# Patient Record
Sex: FEMALE | Race: WHITE
[De-identification: names, ages, dates, MRNs, and addresses within clinical notes are randomized per-mention and may not be internally consistent; named-entity substitution may affect disease eponyms.]

---

## 2018-10-12 ENCOUNTER — HOSPITAL ENCOUNTER (EMERGENCY)
Dept: HOSPITAL 25 - UCCORT | Age: 43
Discharge: HOME | End: 2018-10-12
Payer: COMMERCIAL

## 2018-10-12 VITALS — DIASTOLIC BLOOD PRESSURE: 78 MMHG | SYSTOLIC BLOOD PRESSURE: 112 MMHG

## 2018-10-12 DIAGNOSIS — F17.210: ICD-10-CM

## 2018-10-12 DIAGNOSIS — M79.7: ICD-10-CM

## 2018-10-12 DIAGNOSIS — M62.838: Primary | ICD-10-CM

## 2018-10-12 PROCEDURE — 99213 OFFICE O/P EST LOW 20 MIN: CPT

## 2018-10-12 PROCEDURE — G0463 HOSPITAL OUTPT CLINIC VISIT: HCPCS

## 2018-10-12 PROCEDURE — 96372 THER/PROPH/DIAG INJ SC/IM: CPT

## 2018-10-12 NOTE — UC
Shoulder Pain HPI





- HPI Summary


HPI Summary: 





Pt with h/o chronic pain, fibromylagia presents to  with "knot" and pain in 

left posterior shoulder. Pt states has been present for 3 days. no relief with 

Rx oxycodong or fentanyl. Pt has tried heat and gentle massage without relief. 

Pt reports paresthesia LUE. pt states called Dr. Calderon and recommended she come 

here. Pt is RHD. no trauma. no cp, sob. no other injuries





Pt's medications reviewed this visit





- History of Current Complaint


Chief Complaint: UCUpperExtremity


Stated Complaint: LEFT SHOULDER PAIN


Time Seen by Provider: 10/12/18 13:09


Hx Obtained From: Patient


Hx Last Menstrual Period: 09/20/18


Pregnant?: No


Onset/Duration: Gradual Onset


Pain Intensity: 10





- Allergies/Home Medications


Allergies/Adverse Reactions: 


 Allergies











Allergy/AdvReac Type Severity Reaction Status Date / Time


 


No Known Allergies Allergy   Verified 10/12/18 13:09











Home Medications: 


 Home Medications





Etanercept [Enbrel] 50 mg SC WEEKLY 10/12/18 [History Confirmed 10/12/18]


Hydroxychloroquine TAB* [Plaquenil TAB*] 200 mg PO DAILY 10/12/18 [History 

Confirmed 10/12/18]


oxyCODONE TAB* [Roxycodone TAB 5 mg*] 20 mg PO Q4H PRN 10/12/18 [History 

Confirmed 10/12/18]


sulfaSALAzine TAB* [Azulfidine TAB*] 1,000 mg PO BID 10/12/18 [History 

Confirmed 10/12/18]


tiZANidine TAB* [Zanaflex TAB*] 2 mg PO BID 10/12/18 [History Confirmed 10/12/18

]











PMH/Surg Hx/FS Hx/Imm Hx


Previously Healthy: Yes


Neurological History: Other


Other Neurological History: fibromyalgia





- Surgical History


Surgical History: None





- Family History


Known Family History: Positive: Cardiac Disease, Hypertension





- Social History


Occupation: Disabled


Lives: With Family


Alcohol Use: None


Substance Use Type: None


Smoking Status (MU): Heavy Every Day Tobacco Smoker


Type: Cigarettes


Amount Used/How Often: 1/2 ppd


Length of Time of Smoking/Using Tobacco: 10 YRS





Review of Systems


Constitutional: Negative


Musculoskeletal: Other: - left posterior shoulder pain


Neurological: Paresthesia


All Other Systems Reviewed And Are Negative: Yes





Physical Exam





- Summary


Physical Exam Summary: 





Vital Signs Reviewed: Yes


A+Ox3, mild discomfort


Eyes: Conjunctiva Clear, AMALIA. EOM intact and full


ENT: Hearing grossly normal  TM x 2 clear, mmoist, uvula midline, no exudate, 

no erythema


Neck: Positive: Supple no spinous process pain c/t/l/s


Respiratory: Positive: No respiratory distress, No accessory muscle use + CTA 

throughout  no w/r


Cardiovascular: RRR  nl s1, s2  no m/r  CBT <2  sec


abd soft + BS nt/nd  no guarding, no distension


Musculoskeletal Exam: Pt holding LUE supported against body + pain with 

abduction left shoulder, pain with palpation posterior shoulder along inferior 

scapula - palpable spasm.  + flex.ext elbow, wrist + pronate/supinate 


Neurological: Positive: Alert,  + sensation throughout + thumb up, a ok, finger 

cross, finger spread  + patchy reports of parestheis 2+ bicep without clonus


Psychological: Positive: Normal Response To Family


Skin: Positive: no rash, no ecchymosis


Triage Information Reviewed: Yes


Vital Signs: 


 Initial Vital Signs











Temp  98 F   10/12/18 13:06


 


Pulse  102   10/12/18 13:06


 


Resp  16   10/12/18 13:06


 


BP  112/78   10/12/18 13:06


 


Pulse Ox  98   10/12/18 13:06














Re-Evaluation





- Re-Evaluation


  ** First Eval


Re-Evaluation Time: 14:25


Change: Improved - Pt states pain improved following Tordaol and sling 

recommend out of sling 3-4 times /day - slow circles, bend straightehten  heat 

stretch pt spoke with Dr[facility] Calder[facility]





Shoulder Course/Dx





- Course


Course Of Treatment: Pt with left posterior shoulder pain and muscle spasm.  Pt 

with focal pain, distal CSM intact.  Pt on Fentanyl and oxycodone.  recommend 

sling, heat, toradol, exercise.  no muscle relaxant second to sedation from 

opiates.  f/u with pain management





- Differential Dx/Diagnosis


Provider Diagnoses: muscle spasm left posterior shoulder





Discharge





- Sign-Out/Discharge


Documenting (check all that apply): Patient Departure


All imaging exams completed and their final reports reviewed: No Studies





- Discharge Plan


Condition: Stable


Disposition: HOME


Prescriptions: 


methylPREDNISolone [Medrol Dosepak 4 MG*] 4 mg PO .SEE FLOYD INSTRUCTION #1 tab


Patient Education Materials:  Muscle Spasm (ED)


Referrals: 


Ceci Montano MD [Primary Care Provider] - 


Yair Calderon DO [Doctor of Osteopathy] -  (Monday, 10am as scheduled)


Additional Instructions: 


- Take prednisone as prescribed until gone


- wear sling for comfort and support


- apply heat to the shoulder - then warm, gentle stretching exercises 


-k keep you appointment as scheduled on Monday with Dr sheila Edwards Disposition and Condition


Condition: STABLE


Disposition: Home

## 2018-10-17 ENCOUNTER — HOSPITAL ENCOUNTER (EMERGENCY)
Dept: HOSPITAL 25 - UCCORT | Age: 43
Discharge: HOME | End: 2018-10-17
Payer: COMMERCIAL

## 2018-10-17 VITALS — DIASTOLIC BLOOD PRESSURE: 78 MMHG | SYSTOLIC BLOOD PRESSURE: 123 MMHG

## 2018-10-17 DIAGNOSIS — M54.6: Primary | ICD-10-CM

## 2018-10-17 DIAGNOSIS — F17.210: ICD-10-CM

## 2018-10-17 DIAGNOSIS — M79.7: ICD-10-CM

## 2018-10-17 DIAGNOSIS — M06.9: ICD-10-CM

## 2018-10-17 DIAGNOSIS — G89.4: ICD-10-CM

## 2018-10-17 PROCEDURE — 99212 OFFICE O/P EST SF 10 MIN: CPT

## 2018-10-17 PROCEDURE — G0463 HOSPITAL OUTPT CLINIC VISIT: HCPCS

## 2018-10-17 PROCEDURE — 96372 THER/PROPH/DIAG INJ SC/IM: CPT

## 2018-10-17 NOTE — UC
Back Pain HPI





- HPI Summary


HPI Summary: 


43-year-old female with history of chronic pain syndrome and fibromyalgia 

presents with an 8 day history of left upper back/shoulder pain that radiates 

up into left side of her neck.  She was initially seen at this facility on 10/12

/2018.  Chart reviewed.  She was given dose of Toradol at that time with some 

improvement in her symptoms and discharged home on a Medrol Dosepak with follow-

up with Dr. Calderon, pain management, on 10/15/2018 which she did keep.  States 

that her taste gave her some injections which provided temporary pain relief 

but pain returned later that day.  She spoke with her primary care provider 

today who ordered some x-rays which were done at this facility.  She takes 

oxycodone and fentanyl routinely and states these have provided no relief.  She 

does report some pain relief while taking the Medrol Dosepak.  Describes pain 

as a constant intense spasm that worsens with movement.  She reports some 

intermittent numbness and tingling in the right arm and fingers.  Denies fever, 

chills, injury, rash, chest pain, palpitations, shortness of breath, abdominal 

pain, nausea, vomiting, or weakness of upper extremities.she has follow-up 

appointment scheduled with Dr. Calderon on Monday, October 22.





- History of Current Complaint


Chief Complaint: UCGeneralIllness


Stated Complaint: RECHECK BACK PAIN


Time Seen by Provider: 10/17/18 17:23


Hx Obtained From: Patient


Hx Last Menstrual Period: 10/14/18


Onset/Duration: Gradual Onset, Lasting Days - 8


Timing: Constant


Severity Currently: Severe


Pain Intensity: 10


Character: Spasmodic


Aggravating Factor(s): Movement


Alleviating Factor(s): Nothing


Associated Signs And Symptoms: Positive: Numbness, Tingling.  Negative: Swelling

, Redness, Bruising, Fever, Weakness, Abdominal Pain





- Allergies/Home Medications


Allergies/Adverse Reactions: 


 Allergies











Allergy/AdvReac Type Severity Reaction Status Date / Time


 


No Known Allergies Allergy   Verified 10/17/18 16:38














PMH/Surg Hx/FS Hx/Imm Hx





- Additional Past Medical History


Additional PMH: 


Chronic pain syndrome, fibromyalgia, rheumatoid arthritis








- Surgical History


Surgical History: None





- Family History


Known Family History: Positive: Cardiac Disease, Hypertension





- Social History


Occupation: Disabled


Lives: With Family


Alcohol Use: None


Substance Use Type: Prescribed


Smoking Status (MU): Heavy Every Day Tobacco Smoker


Type: Cigarettes


Amount Used/How Often: 1/2 ppd


Length of Time of Smoking/Using Tobacco: 10 YRS





Review of Systems


Constitutional: Negative


Skin: Negative


Respiratory: Negative


Cardiovascular: Negative


Gastrointestinal: Negative


Motor: Negative


Musculoskeletal: Other: - See HPI


Neurological: Paresthesia, Numbness


Is Patient Immunocompromised?: Yes - Enbrel


All Other Systems Reviewed And Are Negative: Yes





Physical Exam


Triage Information Reviewed: Yes


Appearance: Well-Nourished, Ill-Appearing - Chronically, Pain Distress - Mild-

moderate distress


Vital Signs: 


 Initial Vital Signs











Temp  97.9 F   10/17/18 16:40


 


Pulse  81   10/17/18 16:40


 


Resp  20   10/17/18 16:40


 


BP  123/78   10/17/18 16:40


 


Pulse Ox  100   10/17/18 16:40











Vital Signs Reviewed: Yes


Neck: Positive: Supple, Nontender


Respiratory: Positive: Lungs clear, Normal breath sounds, No respiratory 

distress


Cardiovascular: Positive: RRR, No Murmur, Pulses Normal, Brisk Capillary Refill


Musculoskeletal: Positive: Strength Intact, ROM Intact - Left shoulder, Other: 

- Soft tissue tenderness across left upper back across scapula.


Neurological: Positive: Alert, Muscle Tone Normal, Other: - Sensation intact 

bilateral upper extremities


Skin Exam: Normal





Diagnostics





- Radiology


  ** No standard instances


Radiology Interpretation Completed By: Radiologist - X-rays of C-spin, T-spine, 

and left shoulder ordered by Dr. Crowe and performed earlier today were read 

by radiologist and reviewed. There was some mild DDD of the cervical spin with 

mild straitening of the normal cervical lordosis without evidence of fracture, T

-spine showed straitening of normal kyphosis without evidence of fracture, left 

shoulder with mild AC arthritis but otherwise unremarkable.





Back Pain Course/Dx





- Course


Course Of Treatment: 43 year old female with history of chronic pain syndrome, 

fibromyalgia, and RA presents with 8 day history of non-traumatic left upper 

back/shoulder pain. Her exam was unremarkable except for some soft tissue 

tenderness. The X-rays performed earlier in the day were reviewed and 

unremarkable for acute pathology. She states she had some relief with the 

ketoralac injection she received at previous visit and with the Medrol Dose Cory 

therefore will give her another ketoralac injection and restart her on a short 

course of steroids. She is to follow up with Dr. Calderon, pain management, on 

Monday as scheduled. Warning symptoms reviewed with patient. Verbalizes 

understanding and agrees with POC.





- Differential Dx/Diagnosis


Differential Diagnosis/HQI/PQRI: Arthritis, Fracture, Herniated Disc, Strain, 

Other - fibromyalgia


Provider Diagnoses: left upper back pain





Discharge





- Sign-Out/Discharge


Documenting (check all that apply): Patient Departure


All imaging exams completed and their final reports reviewed: No Studies





- Discharge Plan


Condition: Stable


Disposition: HOME


Prescriptions: 


predniSONE TAB* [Deltasone 10 MG TAB*] 30 mg PO DAILY #15 tab


Patient Education Materials:  Back Pain (ED)


Referrals: 


Ceci Montano MD [Primary Care Provider] - 


Additional Instructions: 


The X-rays that were performed earlier today were reviewed by the radiologist 

and there were no significant findings.





You were given another shot of ketoralac in the clinic today. Do not take any 

other non-steroidal anti-inflammatories such as ibuprofen (Advil, Motrin), 

naproxen (Aleve) or aspirin for at least 8 hours after receiving this injection.





I will send a prescription for prednisone 30 mg daily for 5 days.





Continue your other pain medications as prescribed however I would recommend 

not taking the muscle relaxant with the other pain medications as this may 

cause over sedation.





Continue to use the sling that was provided at your previous visit as needed 

for comfort.





Apply heat to the affected area for 15-20 minutes 4 times a day.





Keep your appointment with Dr. Calderon as scheduled on Monday.





Seek immediate medical attention in the emergency room if you develop any chest 

pain, feeling like your heart is racing or skipping beats, have shortness of 

breath, weakness or dizziness, or any worsening of symptoms.





- Billing Disposition and Condition


Condition: STABLE


Disposition: Home





- Attestation Statements


Provider Attestation: 





Per institutional requirements, I have reviewed the chart, however, I was not 

consulted specifically or made aware of this patient by the  midlevel provider.

  I did not personally evaluate, interact with , or disposition  this patient.

## 2019-02-07 ENCOUNTER — HOSPITAL ENCOUNTER (OUTPATIENT)
Dept: HOSPITAL 25 - AA | Age: 44
Setting detail: OBSERVATION
LOS: 1 days | Discharge: HOME | End: 2019-02-08
Attending: NEUROLOGICAL SURGERY | Admitting: NEUROLOGICAL SURGERY
Payer: MEDICARE

## 2019-02-07 DIAGNOSIS — M50.123: ICD-10-CM

## 2019-02-07 DIAGNOSIS — L40.50: ICD-10-CM

## 2019-02-07 DIAGNOSIS — F17.210: ICD-10-CM

## 2019-02-07 DIAGNOSIS — M54.2: ICD-10-CM

## 2019-02-07 DIAGNOSIS — M50.122: Primary | ICD-10-CM

## 2019-02-07 DIAGNOSIS — M79.7: ICD-10-CM

## 2019-02-07 PROCEDURE — 85730 THROMBOPLASTIN TIME PARTIAL: CPT

## 2019-02-07 PROCEDURE — G0378 HOSPITAL OBSERVATION PER HR: HCPCS

## 2019-02-07 PROCEDURE — 72040 X-RAY EXAM NECK SPINE 2-3 VW: CPT

## 2019-02-07 PROCEDURE — 96376 TX/PRO/DX INJ SAME DRUG ADON: CPT

## 2019-02-07 PROCEDURE — 76000 FLUOROSCOPY <1 HR PHYS/QHP: CPT

## 2019-02-07 PROCEDURE — C1713 ANCHOR/SCREW BN/BN,TIS/BN: HCPCS

## 2019-02-07 PROCEDURE — 96375 TX/PRO/DX INJ NEW DRUG ADDON: CPT

## 2019-02-07 PROCEDURE — C9359 IMPLNT,BON VOID FILLER-PUTTY: HCPCS

## 2019-02-07 PROCEDURE — 96374 THER/PROPH/DIAG INJ IV PUSH: CPT

## 2019-02-07 PROCEDURE — C1776 JOINT DEVICE (IMPLANTABLE): HCPCS

## 2019-02-07 PROCEDURE — 36415 COLL VENOUS BLD VENIPUNCTURE: CPT

## 2019-02-07 RX ADMIN — DEXTROAMPHETAMINE SACCHARATE, AMPHETAMINE ASPARTATE, DEXTROAMPHETAMINE SULFATE AND AMPHETAMINE SULFATE SCH MG: 2.5; 2.5; 2.5; 2.5 TABLET ORAL at 20:20

## 2019-02-07 RX ADMIN — FENTANYL CITRATE PRN MCG: 0.05 INJECTION, SOLUTION INTRAMUSCULAR; INTRAVENOUS at 12:08

## 2019-02-07 RX ADMIN — FENTANYL CITRATE PRN MCG: 0.05 INJECTION, SOLUTION INTRAMUSCULAR; INTRAVENOUS at 11:23

## 2019-02-07 RX ADMIN — DEXTROAMPHETAMINE SACCHARATE, AMPHETAMINE ASPARTATE, DEXTROAMPHETAMINE SULFATE AND AMPHETAMINE SULFATE SCH: 2.5; 2.5; 2.5; 2.5 TABLET ORAL at 20:21

## 2019-02-07 RX ADMIN — OXYCODONE HYDROCHLORIDE PRN MG: 5 CAPSULE ORAL at 14:22

## 2019-02-07 RX ADMIN — OXYCODONE HYDROCHLORIDE AND ACETAMINOPHEN PRN TAB: 5; 325 TABLET ORAL at 12:00

## 2019-02-07 RX ADMIN — WATER SCH NOTE: 100 INJECTION, SOLUTION INTRAVENOUS at 19:12

## 2019-02-07 RX ADMIN — GABAPENTIN SCH MG: 400 CAPSULE ORAL at 14:22

## 2019-02-07 RX ADMIN — LUBIPROSTONE SCH: 8 CAPSULE, GELATIN COATED ORAL at 20:21

## 2019-02-07 RX ADMIN — FENTANYL CITRATE PRN MCG: 0.05 INJECTION, SOLUTION INTRAMUSCULAR; INTRAVENOUS at 12:41

## 2019-02-07 RX ADMIN — GABAPENTIN SCH MG: 400 CAPSULE ORAL at 20:20

## 2019-02-07 RX ADMIN — OXYCODONE HYDROCHLORIDE PRN MG: 5 CAPSULE ORAL at 18:17

## 2019-02-07 RX ADMIN — FENTANYL CITRATE PRN MCG: 0.05 INJECTION, SOLUTION INTRAMUSCULAR; INTRAVENOUS at 11:39

## 2019-02-07 RX ADMIN — OXYCODONE HYDROCHLORIDE AND ACETAMINOPHEN PRN TAB: 5; 325 TABLET ORAL at 11:58

## 2019-02-07 RX ADMIN — OXYCODONE HYDROCHLORIDE PRN MG: 5 CAPSULE ORAL at 23:45

## 2019-02-07 NOTE — OP
DATE OF OPERATION:  02/07/19 - ROOM #347

 

DATE OF BIRTH:  [birth year]

 

SURGEON:  Enrique Salvador MD.

 

ASSISTANT:  AHMET Salmeron.  The case was done with assistance of surgical PA 
because of the complexity of the case.

 

ANESTHESIA:  General.

 

PRE-OP DIAGNOSES:

1.  Degenerative disk disease.

2.  Herniated nucleus pulposus.

 

POST-OP DIAGNOSES:

1.  Degenerative disk disease.

2.  Herniated nucleus pulposus.

 

OPERATIVE PROCEDURE: The patient underwent anterior cervical diskectomy and 
fusion at C5-6 and C6-7 with PEEK interbody cages, autologous locally harvested 
bone graft, DBX, and plate with intraoperative monitoring.

 

ESTIMATED BLOOD LOSS:  10 cc.

 

COMPLICATIONS:  None.

 

SUMMARY:  The patient is a very pleasant 43-year-old female with complaints of 
neck pain radiating to the left upper extremity.  The patient had MRI findings 
consistent with degenerative disk disease with lateral disk herniation at C5-6 
and C6-7.  After failing conservative modalities, she was offered the option of 
surgical intervention in the form of anterior cervical diskectomy and fusion.  
After explaining the expectation, limitations and possible complications of the 
procedure to the patient and her family including her mother and her 
significant other as well as her daughter with complications including, but not 
limited to bleeding, infection, risk of injury to adjacent structures, coma, 
paralysis, death, need for additional procedures, anesthesia risks, stroke, 
blindness, cancer, instability, hardware failure, adjacent level disease, 
spinal fluid leak, pseudoarthrosis, recurrent laryngeal nerve injury, Miah 
syndrome, need for tracheostomy or gastrostomy, the patient was agreeable to 
proceed with surgery and informed consent was obtained.  The patient understood 
that her condition may not improve and in fact may get worse after surgery and 
that she may need to have additional procedures in the future.  She also 
understood that operative plan may be modified according to intraoperative 
findings and conditions and that the case can be abandoned or be done in more 
than 1 stages.  The patient understood that she may need to have prolonged 
hospitalization, prolonged ICU stay.

 

DESCRIPTION OF PROCEDURE:  The patient was brought to the operating room and 
was placed under general anesthesia by anesthesia team.  She was carefully 
positioned supine on the operative table and all bony prominences were 
meticulously padded.  A shoulder blade roll was placed under the shoulders and 
the skin was prepped and draped in the standard fashion.  After appropriate 
surgical pause and patient identification, a right transverse incision was 
marked on the skin and infiltrated with local anesthetic over the levels of C5-
6 and C6-7 as determined from intraoperative fluoroscopic imaging.  The skin 
was incised with a #10 surgical blade and the incision was carried down to the 
subcutaneous tissue.  The subcutaneous tissue was gently undermined and self-
retaining retractors were introduced further into the field.  The platysma 
muscle was then gently elevated and divided with tenotomy scissors.  The 
platysma muscle was undermined and self- retaining retractors were introduced 
further into the field.  The plane between the medial border of the 
sternocleidomastoid and the medial structures was then developed with sharp and 
blunt dissection and the prevertebral fascia was identified.  The self-
retaining retractors were introduced further into the field and intraoperative 
fluoroscopic imaging confirmed appropriate surgical level. Elder pins were 
placed in the vertebral body of C5, C6, and C7, and under microscopic 
magnification, a diskectomy was performed at these 2 levels after incising the 
annulus fibrosus with a #15 surgical blade and diskectomy on the disk space 
preparation was carried out with pituitary rongeurs, Kerrison punches, curettes
, and high-speed drill.  Locally harvested bone graft was saved for the 
arthrodesis part of the procedure.  The posterior longitudinal ligament was 
then identified and gently divided with #1 Kerrison and extensive 
foraminotomies were performed bilaterally, left and right.  Left foramina was 
found to be quite stenotic.  After the end of the diskectomy, the thecal sac 
was pulsating nicely and the foramens were patent bilaterally.  After copious 
irrigation and confirmation of meticulous hemostasis, the disk space was 
appropriately sized and an 8-mm height PEEK interbody cage was introduced first 
at the C6-7 after ports at the C5-6 level after being filled with locally 
harvested bone graft and DBX putty.  The [place] pins were removed and 
appropriately-sized ZEVO plate was placed and secured with temporary pins.  
Intraoperative fluoroscopic image confirmed excellent placement of all 
hardware.  The plate was secured with 13-mm titanium and the screws were 
tightened and locking mechanism was engaged.  After removing the self-retaining 
retractors and after copious irrigation and confirmation of meticulous 
hemostasis and meticulous inspection, the wound was closed by layers over a 
Harry drain which was tunneled through a separate stab wound incision.  The 
external platysma was approximated with 2-0 interrupted Vicryl sutures while 
the subcutaneous tissue was approximated with 2-0 interrupted Vicryl sutures.  
The skin was then covered with Dermabond.  At the end of the procedure, all 
counts were reported to be correct. The patient remained hemodynamically stable 
and intraoperative electrophysiological monitoring remained stable throughout 
the case.  The patient was then extubated and was transferred to Recovery in 
excellent condition.  The case was done with the assistance of surgical PA 
because of the complexity of the case.

 

 [id number]/CPS #: [id number]

MALINA

## 2019-02-07 NOTE — CONSULT
Consult


Consult: 





INPATIENT PAIN CONSULTATION


Diane Gonzalez is a 43 year old female. She has a medical history significant 

for psoriatic arthritis, diagnosed 3-4 years ago, as well as fibromyalgia. She 

sees Dr. Crowe, the rheumatologist for this and takes sulfasalazine and 

enbrel. She has chronic pain and has been a patient of Dr. Yair Calderon. She is 

on chronic opioid therapy for her pain. She normally takes oxycodone 20 mg 

tablets up to 6 times a day as well as a Fentanyl patch, 75 mcg/hr, changing 

every 3 days. She also takes gabapentin 800 TID and Cymbalta 90 mg a day. She 

developed neck pain down into her left shoulder which became severe. She saw 

the [facility] in [place] and was eventually referred to Dr. Salvador. She had an MRI of her cervical spine showing left disc herniations 

at C5/6 and C6/7. She was admitted this morning and underwent an anterior 

cervical decompression and fusion with PEEK interbody spacers. I am asked to 

see her for pain management. 





PAST MEDICAL HISTORY: ADHD, Fibromyalgia, Depression





 Allergies











Allergy/AdvReac Type Severity Reaction Status Date / Time


 


No Known Allergies Allergy   Verified 02/07/19 06:17








 


 Current Medications





Hydrocodone Bitart/Acetaminophen (Norco 5-325 Tab*)  1 tab PO ONCE PRN


   PRN Reason: PAIN - MODERATE


Amphetamine/Dextroamphetamine (Adderall Tab*)  10 mg PO BID LIBAN


Baclofen (Lioresal Tab*)  10 mg PO BID PRN


   PRN Reason: SPASMS - BACK


Dexamethasone Sodium Phosphate (Decadron Iv*)  8 mg IV SLOW PU ONCE ONE


   Stop: 02/07/19 06:01


   Last Admin: 02/07/19 06:23 Dose:  8 mg


Dimenhydrinate (Dramamine Iv*)  25 mg IV PUSH ONCE PRN


   PRN Reason: NAUSEA/VOMITING


Duloxetine HCl (Cymbalta Cap*)  30 mg PO DAILY LIBAN


Duloxetine HCl (Cymbalta Cap*)  60 mg PO DAILY LIBAN


Famotidine (Pepcid Iv*)  20 mg IV ONCE ONE


   Stop: 02/07/19 06:01


   Last Admin: 02/07/19 06:23 Dose:  20 mg


Fentanyl (Duragesic Patch 25 Mcg/Hr*)  75 mcg TRANSDERM Q72H LIBAN


Fentanyl Citrate (Fentanyl*)  50 mcg IV Q5M PRN


   PRN Reason: PAIN - MODERATE


   Last Admin: 02/07/19 12:41 Dose:  50 mcg


Gabapentin (Neurontin Cap(*))  800 mg PO TID [facility]


Lactated Ringer's (Lactated Ringers 1000 Ml Bag*)  1,000 mls @ 125 mls/hr IV 

PER RATE LIBAN


   Last Admin: 02/07/19 06:23 Dose:  125 mls/hr


Lactated Ringer's (Lactated Ringers 1000 Ml Bag*)  1,000 mls @ 75 mls/hr IV 

.per rate [facility]


Lidocaine/Sodium Bicarbonate (Buffered Lidocaine 1% Syrin*)  0.2 ml INTRADERM 

ONCE ONE


   Stop: 02/06/19 12:19


   Last Admin: 02/07/19 06:23 Dose:  0.2 ml


Lubiprostone (Amitiza (Nf))  8 mcg PO BID [facility]


Magnesium Hydroxide (Milk Of Magnesia Liq*)  30 ml PO DAILY PRN


   PRN Reason: CONSTIPATION


Naloxone HCl (Narcan*)  0.08 mg IV Q2M PRN


   PRN Reason: severe induced resp depression


Non-Formulary Medication (Clobetasol Propionate/Emoll [Clobetasol Emollient 0.05

% Crm])  0.05 % EX DAILY PRN


   PRN Reason: ITCHING


Ondansetron HCl (Zofran Inj*)  4 mg IV Q6H PRN


   PRN Reason: NAUSEA/VOMITING


Oxycodone HCl (Roxycodone Tab*)  20 mg PO Q6H PRN


   PRN Reason: PAIN


Oxycodone/Acetaminophen (Percocet 5/325 Tab*)  1 tab PO ONCE PRN


   PRN Reason: PAIN - MODERATE


   Last Admin: 02/07/19 12:00 Dose:  1 tab





SOCIAL HISTORY: Quit smoking, does not drink. Has SO





 Vital Signs











Temp Pulse Resp BP Pulse Ox


 


 98.2 F   100   18   125/85   96 


 


 02/07/19 13:33  02/07/19 13:33  02/07/19 13:45  02/07/19 13:33  02/07/19 13:33








EXAM:


NECK: Immobilized in [place]


LUNGS: Clear


HEART: reg rhythm


ABDOMEN: Soft


EXTREMITIES: Normal tone bilat UEs and LEs


NEUROLOGIC: Sensation intact





ASSESSMENT: 


1. S/P ACDF C5/6 and C6/7


2. Psoriatic Arthritis


3. Chronic Pain on chronic opioids





PLAN: I think we need to increase her PRN pain medications. Will change to 

oxycodone 30 mg every 4 hours for severe pain. She is already on both cymbalta 

and gabapentin. She uses Baclofen normally for a muscle relaxer and she has 

amitiza for bowel medications. I will follow

## 2019-02-08 VITALS — SYSTOLIC BLOOD PRESSURE: 120 MMHG | DIASTOLIC BLOOD PRESSURE: 68 MMHG

## 2019-02-08 RX ADMIN — OXYCODONE HYDROCHLORIDE PRN MG: 5 CAPSULE ORAL at 03:38

## 2019-02-08 RX ADMIN — GABAPENTIN SCH MG: 400 CAPSULE ORAL at 13:15

## 2019-02-08 RX ADMIN — OXYCODONE HYDROCHLORIDE PRN MG: 5 CAPSULE ORAL at 13:16

## 2019-02-08 RX ADMIN — OXYCODONE HYDROCHLORIDE PRN MG: 5 CAPSULE ORAL at 08:29

## 2019-02-08 RX ADMIN — DEXTROAMPHETAMINE SACCHARATE, AMPHETAMINE ASPARTATE, DEXTROAMPHETAMINE SULFATE AND AMPHETAMINE SULFATE SCH MG: 2.5; 2.5; 2.5; 2.5 TABLET ORAL at 08:28

## 2019-02-08 RX ADMIN — LUBIPROSTONE SCH: 8 CAPSULE, GELATIN COATED ORAL at 08:41

## 2019-02-08 RX ADMIN — WATER SCH NOTE: 100 INJECTION, SOLUTION INTRAVENOUS at 07:13

## 2019-02-08 RX ADMIN — GABAPENTIN SCH MG: 400 CAPSULE ORAL at 08:27

## 2019-02-08 NOTE — PN
Progress Note





- Progress Note


Date of Service: 02/08/19


SOAP: 


Subjective:


[]No events ON. Patient tolerated the procedure well yesterday. Ambulates, 

Tolerates PO well, Voids. Mild incision pain. Preop LUE radiculopathy resolved. 

Wants to go home.








Objective:


[]VSS, Afebrile


Wound, s,c,d. On MJ collar.


Drain output noted. Drain was removed. Catheter appeared to be intact. Patient 

tolerated procedure well.


AAOx3, AMALIA, CN II-XII grossly intact.


Motor 5/5 all extremities


Sensory grossly intact to light touch





Assessment:


[]43 yof POD#1 ACDF C5-6,C6-7








Plan:


[]Monitor VS, Neurochecks


Encourage ambulation.


XR revealed good placement of hardware, good alignment of spine.


DC today.


Full instructions were given.


Appreciate Dr Lynn's help with pain management.





DORENE Salvador MD

## 2019-09-09 ENCOUNTER — HOSPITAL ENCOUNTER (EMERGENCY)
Dept: HOSPITAL 25 - UCCORT | Age: 44
Discharge: HOME | End: 2019-09-09
Payer: MEDICARE

## 2019-09-09 VITALS — DIASTOLIC BLOOD PRESSURE: 75 MMHG | SYSTOLIC BLOOD PRESSURE: 113 MMHG

## 2019-09-09 DIAGNOSIS — H92.01: Primary | ICD-10-CM

## 2019-09-09 DIAGNOSIS — F17.210: ICD-10-CM

## 2019-09-09 DIAGNOSIS — J40: ICD-10-CM

## 2019-09-09 PROCEDURE — 99212 OFFICE O/P EST SF 10 MIN: CPT

## 2019-09-09 PROCEDURE — G0463 HOSPITAL OUTPT CLINIC VISIT: HCPCS

## 2019-09-09 NOTE — XMS REPORT
Continuity of Care Document (CCD)

 Created on:2019



Patient:Diane Gonzalez

Sex:Female

:1975

External Reference #:[id number]





Demographics







 Address  [address]



   [place] [address]

 

 Home Phone  [phone]

 

 Preferred Language  en

 

 Marital Status  Not  or 

 

 Pentecostal Affiliation  Unknown

 

 Race  White

 

 Ethnic Group  Not  or 









Author







 Name  Yair Calderon DO MPH

 

 Address  [address] [place] [address], [address]



   [place] [address]









Care Team Providers







 Name  Role  Phone

 

 Mary Churchill M.D. - Internal Medicine  Care Team Information   [phone]
[phone]

 

 Ceci Montano M.D. - Family Medicine  Care Team Information   [phone]
[phone]









Problems







 Description

 

 No Information Available







Social History







 Type  Date  Description  Comments

 

 Birth Sex    Unknown  

 

 Cigarette Use    Current Cigarette Smoker  1 Pack Daily  

 

 ETOH Use    Rarely consumes alcohol  

 

 Tobacco Use  Start: Unknown  Patient is a current smoker, smokes every  



     day  

 

 Smoking Status  Reviewed: 19  Patient is a current smoker, smokes every  



     day  







Allergies, Adverse Reactions, Alerts







 Active Allergies  Reaction  Severity  Comments  Date

 

 Oxymorphone      itching  12/15/2014









 Inactive Allergies









 NKDA        2014







Medications







 Active Medications  SIG  Qnty  Indications  Ordering  Date



         Provider  

 

 Fentanyl  si by mouth  10units    Yair Calderon,  08/10/2017



    75mcg/HR Patches 72HR  q72h as      DO, MPH  



   directed        



   chronic pain        



   patient, mylan        



   brand only        

 

 Amitiza  si by mouth  60caps    Yair Calderon,  2016



   24mcg Capsules  twice a day      DO, MPH  



           

 

 Oxycodone HCL  si-2 by  210tabs    Yair Calderon,  2016



         20mg Tablets  mouth every 4      DO, MPH  



   to 6 hours as        



   directed        



   chronic pain        



   patient        

 

 Cymbalta  1 by mouth  30caps    Unknown  



    30mg Caps DR Part  every 8 hours        



   as directed        

 

 Flonase Allergy Relief        Unknown  



           



 50mcg/Act Suspension          



           

 

 Gabapentin  si by mouth      Unknown  



      300mg Capsules  three times a        



   day as directed        

 

 Sulfasalazine  1 by mouth      Unknown  



         500mg Tablets  three times        



   daily        

 

 Amphetamine-Dextroampheta  1 by mouth      Unknown  



 mine  every day as        



 5mg Tablets  directed        



           

 

 Enbrel  weekly      Unknown  



  50mg/ml Soln Prefill          



 Syringe          

 

 Hydroxychloroquine  1 by mouth      Unknown  



 Sulfate  daily        



   200mg Tablets          



           

 

 Diclofenac Sodium  1 by mouth      Unknown  



             50mg Tablets  three times a        



 DR  day        









 History Medications









 Cyclobenzaprine HCL  si/2 to 1 by  60tabs    Yair Calderon,  2019 -



               10mg  mouth twice a day      BRIGITTE CAMILO  2019



 Tablets  as directed        







Immunizations







 Description

 

 No Information Available







Vital Signs







 Date  Vital  Result  Comment

 

 2019  1:51pm  BP Systolic  128 mmHg  









 BP Diastolic  84 mmHg  

 

 Heart Rate  88 /min  

 

 Respiratory Rate  20 /min  

 

 Height  68 inches  5'8"

 

 Weight  147.00 lb  

 

 Pain Level  6  Pain at this time.

 

 Pain Level With Medicine  5  on average with meds

 

 Pain Level Without Medicine  9  without meds

 

 BMI (Body Mass Index)  22.3 kg/m2  









 2019  2:48pm  BP Systolic  128 mmHg  









 BP Diastolic  84 mmHg  

 

 Heart Rate  86 /min  

 

 Respiratory Rate  20 /min  

 

 Height  68 inches  5'8"

 

 Weight  148.00 lb  

 

 Pain Level  7  Pain at this time.

 

 Pain Level With Medicine  6  on average with meds

 

 Pain Level Without Medicine  9  without meds

 

 BMI (Body Mass Index)  22.5 kg/m2  







Results







 Description

 

 No Information Available







Procedures







 Date  Code  Description  Status

 

 2019  20919  Omt  3-4 Body Regions  Completed

 

 2019  Arthrocentesis Aspirtation Inj,Intermediate W/ US Guide &  
Completed



     Report  

 

 201951  Injection, Tendon Origin/Insertion  Completed







Medical Devices







 Description

 

 No Information Available







Encounters







 Type  Date  Location  Provider  Dx  Diagnosis

 

 Office Visit  2019  Main Office as Of  Yair Calderon DO,  G89.29  Other 
chronic pain



   2:45p  14  [facility]    









 M54.6  Pain in thoracic spine

 

 M99.02  Segmental and somatic dysfunction of thoracic region

 

 M54.5  Low back pain

 

 M99.03  Segmental and somatic dysfunction of lumbar region

 

 M53.3  Sacrococcygeal disorders, not elsewhere classified

 

 M99.04  Segmental and somatic dysfunction of sacral region

 

 Z79.891  Long term (current) use of opiate analgesic









 Office Visit  2019  2:15p  Main Office as  CalderonYair connors,  G89.29  Other 
chronic



     Of 14  DO, MPH    pain









 G89.18  Other acute postprocedural pain

 

 M25.572  Pain in left ankle and joints of left foot

 

 M65.88  Other synovitis and tenosynovitis, other site

 

 Z79.891  Long term (current) use of opiate analgesic









 Office Visit  2019  3:00p  Main Office as  CalderonJunaid connorsph,  G89.29  Other 
chronic



     Of 14  DO, MPH    pain









 G89.18  Other acute postprocedural pain

 

 M54.6  Pain in thoracic spine

 

 M54.2  Cervicalgia

 

 Z79.891  Long term (current) use of opiate analgesic









 Office Visit  2019  1:30p  Main Office as  CalderonYair connors,  G89.29  Other 
chronic



     Of 14  DO, MPH    pain









 G89.18  Other acute postprocedural pain

 

 M54.2  Cervicalgia

 

 M54.6  Pain in thoracic spine

 

 M79.7  Fibromyalgia

 

 M06.89  Other specified rheumatoid arthritis, multiple sites

 

 Z79.891  Long term (current) use of opiate analgesic









 Office Visit  2019  2:30p  Main Office as  CalderonYair connors,  G89.29  Other 
chronic



     Of 14  DO, MPH    pain









 M54.2  Cervicalgia

 

 M54.6  Pain in thoracic spine

 

 M06.89  Other specified rheumatoid arthritis, multiple sites

 

 M79.7  Fibromyalgia

 

 Z79.891  Long term (current) use of opiate analgesic







Assessments







 Date  Code  Description  Provider

 

 2019  G89.29  Other chronic pain  Calderon, Yair, DO, MPH

 

 2019  M54.5  Low back pain  Calderon, Yair, DO, MPH

 

 2019  M53.3  Sacrococcygeal disorders, not elsewhere  Calderon, Yair, DO, 
[facility]



     classified  

 

 2019  M54.6  Pain in thoracic spine  Calderon, Yair, DO, MPH

 

 2019  Z79.891  Long term (current) use of opiate analgesic  Calderon, Yair
, DO, MPH

 

 2019  G89.29  Other chronic pain  Calderon, Yair, DO, MPH

 

 2019  M54.6  Pain in thoracic spine  Calderon, Yair, DO, MPH

 

 2019  M99.02  Segmental and somatic dysfunction of  Calderon, Yair, DO, MPH



     thoracic region  

 

 2019  M54.5  Low back pain  Calderon, Yair, DO, MPH

 

 2019  M99.03  Segmental and somatic dysfunction of lumbar  Calderon, Yair, 
DO, MPH



     region  

 

 2019  M53.3  Sacrococcygeal disorders, not elsewhere  Calderon, Yair, DO, 
[facility]



     classified  

 

 2019  M99.04  Segmental and somatic dysfunction of sacral  Calderon, Yair, 
DO, MPH



     region  

 

 2019  Z79.891  Long term (current) use of opiate analgesic  Calderon, Yair
, DO, MPH

 

 2019  G89.29  Other chronic pain  Calderon, Yair, DO, MPH

 

 2019  G89.18  Other acute postprocedural pain  Calderon, Yair, DO, MPH

 

 2019  M25.572  Pain in left ankle and joints of left foot  Calderon, Yair, 
DO, MPH

 

 2019  M65.88  Other synovitis and tenosynovitis, other  Calderon, Yair, DO
, MPH



     site  

 

 2019  Z79.891  Long term (current) use of opiate analgesic  Calderon, Yair
, DO, MPH

 

 2019  G89.29  Other chronic pain  Calderon, Yair, DO, MPH

 

 2019  G89.18  Other acute postprocedural pain  Calderon, Yair, DO, MPH

 

 2019  M54.6  Pain in thoracic spine  Calderon, Yair, DO, MPH

 

 2019  M54.2  Cervicalgia  Calderon, Yair, DO, MPH

 

 2019  Z79.891  Long term (current) use of opiate analgesic  Calderon, Yair
, DO, MPH

 

 2019  G89.29  Other chronic pain  Calderon, Yair, DO, MPH

 

 2019  G89.18  Other acute postprocedural pain  Calderon, Yair, DO, MPH

 

 2019  M54.2  Cervicalgia  Calderon, Yair, DO, MPH

 

 2019  M54.6  Pain in thoracic spine  Calderon, Yair, DO, MPH

 

 2019  M79.7  Fibromyalgia  Yair Calderon DO MPH

 

 2019  M06.89  Other specified rheumatoid arthritis,  Yair Calderon DO, 
[facility]



     multiple sites  

 

 2019  Z79.891  Long term (current) use of opiate analgesic  Yair Calderon DO, MPH

 

 2019  G89.29  Other chronic pain  Yair Calderon DO MPH

 

 2019  M54.2  Cervicalgia  Yair Calderon DO, MPH

 

 2019  M54.6  Pain in thoracic spine  Yair Calderon DO, MPH

 

 2019  M06.89  Other specified rheumatoid arthritis,  Yair Calderon DO, 
MPH



     multiple sites  

 

 2019  M79.7  Fibromyalgia  Yair Calderon DO MPH

 

 2019  Z79.891  Long term (current) use of opiate analgesic  Yair Calderon DO MPH







Plan of Treatment

Future Appointment(s):2019  2:45 pm - Yair Calderon DO MPH at Main 
Office as Of  - Yair Calderon DO, MPHG89.29 Other chronic 
painComments:Chronic. Symptoms and complaints discussed and reviewed today. No 
significant changes in physical findings.  Continue current medical pain 
management.M54.5 Low back painComments:Chronic. Symptoms and complaints 
discussed and reviewed today.No changes in physical findings. Patient is stable 
and comfortable when current medical therapy is rendered.M53.3 Sacrococcygeal 
disorders, not elsewhere classifiedComments:Chronic. Symptoms and complaints 
discussed and reviewed today. Notable sacral somatic dysfunctions warranting 
OMT. Patient is stable and comfortable with current medical therapy.M54.6 Pain 
in thoracic spineComments:Chronic.Symptoms and complaints discussed and 
reviewed today. No significant changes in physical findings. Continue current 
medical pain management.Z79.891 Long term (current) use of opiate analgesicNew 
Labs:Urine Drug Screen, Ordered: 19Comments:Urine drug screen sample 
taken today to monitor opiate use and to monitor use of illicit substances.Will 
discuss results at next appointment.The following tests were ordered:6 AM, AMPH
, DEEDEE, JOSEFA, BUP, CARIS, COCM, COT, ETG, FENT, MCSHSG,  OPI, OXY, PCP, TAPEN,  
XTSY, ZOLP.      A urine drug test (UDT) was ordered for this patient and 
collected on site today.  Creatinine has been ordered as well for specimen 
validity, not for kidney function.  Preliminary UDT results are not final and 
should not be used to determine patient care or plan of treatment.  Initially a 
qualitative immunoassay screen will be done.  Any inconsistent or positive 
findings will be further tested with a more comprehensive quantitative 
confirmation LCMS study. It is part of the treatment process of prescribing 
controlled substances and is considered standard of care.AllComments:Continue 
current medical pain management;  injection therapy, osteopathic manipulation, 
PT / modalities, and consults as needed to manage chronic pain.Non - opioid 
pain management discussed and optionsdiscussed.Side effects discussed; 
anticipatory guidance given. Patient clearly understand and agree with all 
medical treatments and suggestions. All medicines prescribed are adequate and 
appropriate for this patient's complaint of pain, medical history, physical, 
and personal goals.Goals of Treatment are to provide adequate and appropriate 
multidisciplinary medical pain management to increase/ maintain patient's 
quality of life and functionality while maintaining satisfactory side effect 
profile andminimizing long term end-organ damage. Importance of regular 
nutrition throughout the day discussed.Activity as toleratedContinue with PCP



Functional Status







 Description

 

 No Information Available







Mental Status







 Description

 

 No Information Available







Referrals







 Description

 

 No Information Available

## 2019-09-09 NOTE — UC
Ear Complaint HPI





- HPI Summary


HPI Summary: 





44-year-old female who has had a productive cough of brownish sputum over the 

past 4-5 days as well as a right earache which radiates into her right jaw.  

She is a smoker however states only 6 cigarettes per day.





- History of Current Complaint


Chief Complaint: UCGeneralIllness


Stated Complaint: CONGESTION,RT EAR/JAW COMPLAINT


Time Seen by Provider: 09/09/19 11:48


Hx Obtained From: Patient


Hx Last Menstrual Period: 11/11/18


Pregnant?: No


Onset/Duration: Gradual Onset


Severity Initially: Mild


Severity Currently: Moderate


Pain Intensity: 6


Aggravating Factors: Cold


Alleviating Factors: Nothing


Associated Signs/Symptoms: Positive: URI Symptoms


Related History: Smoking





- Allergies/Home Medications


Allergies/Adverse Reactions: 


 Allergies











Allergy/AdvReac Type Severity Reaction Status Date / Time


 


No Known Allergies Allergy   Verified 09/09/19 11:39














PMH/Surg Hx/FS Hx/Imm Hx


Previously Healthy: Yes


Other Endocrine History: fibromyalgia





- Surgical History


Surgical History: Yes


Surgery Procedure, Year, and Place: back surgery Feb 2019





- Family History


Known Family History: Positive: Cardiac Disease, Hypertension, Diabetes





- Social History


Alcohol Use: None


Substance Use Type: None


Smoking Status (MU): Heavy Every Day Tobacco Smoker


Type: Cigarettes


Amount Used/How Often: 6 daily


Length of Time of Smoking/Using Tobacco: 10 YRS


Have You Smoked in the Last Year: Yes


When Did the Patient Quit Smoking/Using Tobacco: ONE MONTH AGO


Household Exposure Type: Cigarettes





- Immunization History


Most Recent Influenza Vaccination: 2018


Most Recent Pneumonia Vaccination: NONE





Review of Systems


All Other Systems Reviewed And Are Negative: Yes


ENT: Positive: Ear Ache - Right earache


Respiratory: Positive: Cough - Productive cough of brownish sputum


Is Patient Immunocompromised?: No





Physical Exam


Triage Information Reviewed: Yes


Appearance: Well-Appearing, No Pain Distress, Well-Nourished


Vital Signs: 


 Initial Vital Signs











Temp  98.3 F   09/09/19 11:34


 


Pulse  89   09/09/19 11:34


 


Resp  18   09/09/19 11:34


 


BP  113/75   09/09/19 11:34


 


Pulse Ox  100   09/09/19 11:34











Vital Signs Reviewed: Yes


Eyes: Positive: Conjunctiva Clear


ENT: Positive: Hearing grossly normal, Pharynx normal, TMs normal, Uvula midline


Dental Exam: Normal


Neck: Positive: Supple, Nontender, No Lymphadenopathy


Respiratory: Positive: No respiratory distress, No accessory muscle use, 

Rhonchi - Scattered rhonchi, no distress.


Cardiovascular: Positive: RRR, No Murmur, Pulses Normal, Brisk Capillary Refill


Abdomen Description: Positive: Nontender, No Organomegaly, Soft.  Negative: CVA 

Tenderness (R), CVA Tenderness (L)


Bowel Sounds: Positive: Present


Musculoskeletal: Positive: Strength Intact, ROM Intact


Neurological: Positive: Alert, Muscle Tone Normal


Psychological Exam: Normal


Skin Exam: Normal





Ear Complaint Course/Dx





- Course


Course Of Treatment: 





The patient was advised to stop smoking.  I'm going to treat her with a Z-Floyd 

and follow-up with her primary care provider if no improvement.





- Differential Dx/Diagnosis


Provider Diagnosis: 


 Otalgia, right ear, Bronchitis








Discharge ED





- Sign-Out/Discharge


Documenting (check all that apply): Patient Departure


All imaging exams completed and their final reports reviewed: No Studies





- Discharge Plan


Condition: Fair


Disposition: HOME


Prescriptions: 


Azithromyxin FLOYD (NF) [Z-Floyd (Zithromax) 250 mg tabs #6] 2 tab PO .TODAY, THEN 

1 DAILY #6 tab


Patient Education Materials:  Acute Bronchitis (ED)


Referrals: 


Ceci Montano MD [Primary Care Provider] - 


Additional Instructions: 


Increase fluids, stop smoking, may alternate Motrin every 8 hours and Tylenol 

every 4 hours for pain.  Follow-up with your primary care provider in 3 or 4 

days if no improvement.





- Billing Disposition and Condition


Condition: FAIR


Disposition: Home

## 2023-07-04 NOTE — XMS REPORT
Continuity of Care Document (CCD)

 Created on:2019



Patient:Diane Gonzalez

Sex:Female

:1975

External Reference #:[id number]





Demographics







 Address  [address] [place] [address]

 

 Mobile Phone  [phone]

 

 Email Address  [email or web address]

 

 Preferred Language  en

 

 Marital Status  Not  or 

 

 [employer] Affiliation  Unknown

 

 Race  White

 

 Ethnic Group  Not  or 









Author







 Name  Fany Mays









Support







 Name  Relationship  Address  Phone

 

 Robby Peña  Unavailable  Unavailable  [phone]

 

 Aundrea Gonzalez  Unavailable  Unavailable  [phone]









Care Team Providers







 Name  Role  Phone

 

 Ceci Montano MD  Primary Care Physician  Unavailable









Payers







 Type  Date  Identification Numbers  Payment Provider  Subscriber

 

     Policy Number: [id number]  Medicare  Diane Gonzalez









 PayID: [id number]  [address]









 [place] [address]









   Effective: 2016  Policy Number:  [employer]  Diane Goznalez



     [id number]    









 Expires: 2019  Group Name: [id number]  [address] [address]









 PayID: [id number]  [place] [address]









   Expires: 2016  Policy Number: [id number]  [facility]  Diane Gonzalez









 PayID: [id number]  [address] 









 [place] [address]









   Expires: 2019  Policy Number: [id number]  [facility]  Diane Gonzalez









 PayID: [id number]  [address] 









 [place] [address]









   Effective: 2011  Policy Number: [id number]  [employer] Ppo  Diane Gonzalez









 Expires: 2011  PayID: [id number]  [address] 









 [place] [address]









   Expires: 2019  PayID: [id number]  [facility]  Diane [facility]









 [address] 

 

 [place] [address]









   Effective: 2019  Policy Number: [id number]  Medicaid  Diane Gonzalez









 Group Name: 1 1  [address]

 

 PayID: [id number]  [place] [address]







Advance Directives







 Description

 

 No Information Available







Problems







 Date  Description  Provider  Status

 

 Onset: 2016  Disturbance in sleep behavior  Nicole Meneses MD  Active







Family History







 Date  Family Member(s)  Problem(s)  Comments

 

   General  Rheumatoid Arthritis  

 

   General  Colon Cancer  

 

   General  Diabetes Type II  

 

   General  Alzheimer's Disease  

 

   Father  Colon Cancer  

 

   Father  Diabetes Type II  

 

   Father  Chronic Obstructive Pulmonary Disease (COPD)  

 

   Mother  Alive And Well  

 

   Paternal Grandfather   due to Unknown Causes  ()

 

   Paternal Grandmother   due to Unknown Causes  ()

 

   Maternal Grandfather   due to Diabetes  ()

 

   Maternal Grandmother   due to Alzheimer's Disease  ()







Social History







 Type  Date  Description  Comments

 

 Birth Sex    Unknown  

 

 Marital Status    Significant Other  

 

 Lives With    Boyfriend  

 

 Occupation    Manager  

 

 Occupation      

 

 Tobacco Use  Start: Unknown  currently smokes 1/2 Pack Daily  

 

 Smoking Status  Reviewed: 19  currently smokes 1/2 Pack Daily  

 

 ETOH Use    Denies alcohol use  

 

 Tobacco Use  Start: Unknown  Patient is a current smoker,  



     smokes every day  

 

 Recreational Drug Use    Denies Drug Use  

 

 Exercise Type/Frequency    Exercises rarely  







Allergies, Adverse Reactions, Alerts







 Description

 

 No Known Drug Allergies







Medications







 Medication  Date  Status  Form  Strength  Qnty  SIG  Indications  Ordering



                 Provider

 

 Adderall  20  Active  Tablets  10mg  60tabs  take 1  R53.83  Mykel



   19          tablet by    Sebastienneyda two    M.D.



             times daily    



             -- maximum    



             daily dose    



             of 2 per day    

 

 Baclofen  20  Active  Tablets  10mg  14tabs  take one  L40.50  Mykel



   18          tablet by    Sebastien



             mouth twice    M.D.



             a day as    



             needed for    



             muscle    



             spasms -    



             avoid    



             driving and    



             other    



             muscular    



             relaxants    









 M25.512









 Ciclopirox  2018  Active  Cream  0.77%  30gm  apply to nail    Mykel



 Olamine            daily as    eleni Crowe M.D.

 

 B12 Fast  08/15/2018  Active  Tablets  5000mcg  90tabs  sublingual    Mykel



 Dissolve      Dispers      daily    DAVID Crowe

 

 Duloxetine HCL  2018  Active  Caps DR  30mg  90caps  Take One    Mykel



       Part      Capsule By    Neyda Crowe Daily    M.DWinter



             Along With 60    



             MG Dose    

 

 Clobetasol  2018  Active  Ointment  0.05%  30unit  Apply Once  L4  Mykel



 Propionate          s  Daily as Needed  0.  Sebastien,



             For Psoriasis,  52  M.D.



             Avoid Face And    



             Neck    

 

 Chantix Starting  2018  Active  Tablets  0.5mg X  55tabs  as directed,  
F1  Mykel



 Month Cory        11 & 1    stop smoking  7.  Sebastien,



         mg X 42      21  M.D.



               0  

 

 Chantix  2018  Active  Tablets  1mg  90tabs  day 8 to end of  F1  Mykel



             treatment: 1 mg  7.  Sebastien,



             by mouth twice  21  M.D.



             a day  0  

 

 Beeswax (Yellow)  2018  Active  Wax    3gm  apply daily for  F1  Mykel



             dry skin as  7.  Sebastien,



             needed  21  M.D.



               0  

 

 Enbrel Sureclick  2018  Active  Solution  50mg/ml  3.92un  inject 50mg  
L4  Mykel



       Auto-Injec    its  under the skin  0.  Sebastien,



       t      every week  50  M.D.

 

 Neoprene Patella  2017  Active  Misc    2units  use daily as    Mykel



 Knee            needed for    Sebastien,



 Support/Medium            right and left    M.D.



             knee    



             stabilization    



             715.19    

 

 Cymbalta  2017  Active  Caps   30mg  90caps  please take one    Mykel



       Part      capsule by    Sebastien,



             mouth daily    M.D.



             along with 60    



             mg dose    

 

 Duloxetine HCL  2017  Active  Caps DR  60mg  90caps  1 by mouth    Mykel



       Part      every day along    Sebastien,



             with 30 mg    M.D.



             capsule    

 

 Systane  2017  Active  Solution  0.4-0.3  30unit  apply twice  M7  Ymkel



 Preservative        %  s  daily for dry  9.  Sebastien,



 Free            eyes  1  M.D.

 

 Sulfasalazine  2017  Active  Tablets  500mg  120tab  Take 2 Tablets  L4  
Mykel



           s  By Mouth In The  0.  Sebastien,



             Morning And In  52  M.D.



             The Evening For    



             A Total Of 4    



             Tablets Daily    



             Ongoing    

 

 Amitiza  2016  Active  Capsules  8mcg    1 by mouth    Unknown



             twice a day    

 

 Fentanyl  2016  Active  Patches  37.5mcg    1 patch every    Unknown



       72HR  /HR    72 hours    

 

 Oxycodone HCL  2016  Active  Tablets  20mg    1 tablet by    Unknown



             mouth 4 times a    



             day as needed    

 

 Gabapentin  2016  Active  Capsules  400mg  180cap  Take 1 Capsule  M7  
Mykel



           s  By Mouth Six  9.  Sebastien,



             Times A Day  7  M.D.

 

                 

 

 Nabumetone  2018 -  Hx  Tablets  500mg  60tabs  take one  L4  Mykel



   2018          capsule/tablet  0.  Sebastien,



             by mouth twice  50  M.D.



             daily as needed    



             for pain,    



             please stop    



             other nsaids,    



             avoid at same    



             time as    



             duloxetine    









 M25.512









 Diclofenac Sodium  10/16/2018 -  Hx  Gel  3%  100gm  apply twice  L40.50  
Mykel



   2018          daily to your    CORRY Crowe.



             shoulder region    









 M25.512









 Provigil  08/15/2018 -  Hx  Tablets  100mg  30tabs  take one  R53.83  Mykel



   10/17/2018          capsule/tablet    Sebastien,



             daily by mouth    M.D.



             as needed for    



             hypersomnia mdd    



             1(failed    



             adderall)    

 

 Enbrel Mini  2018 -  Hx  Solution  50mg/ml  3.920ml  sq weekly  L40.50  
Mykel



   2018    Cartridge          JULIAN CroweD.

 

 Adderall  2017 -  Hx  Tablets  5mg  120tabs  take two tablets  R53.83  
Mykel



   2019          by mouth twice a    Sebastien,



             day max/day=4    M.D.

 

 Nuvigil  10/23/2017 -  Hx  Tablets  50mg  30tabs  take one  L40.50  Mykel



   2017          capsule/tablet    Sebastien,



             daily by mouth    M.D.



             as needed for    



             hypersomnia    









 F51.11









 Diclofenac  2017 -  Hx  Tablets DR  75mg  60tabs  Take One  L40.50  
Mykel



 Sodium  10/16/2018          Tablet By    Sebastien,



             Mouth Twice A    M.D.



             Day as Needed    



             For Pain,    



             Avoid Other    



             NSAIDS    

 

 Naproxen  2017 -  Hx  Tablets  500mg  60tabs  1 tablet with    Mykel



   2017          food by mouth    Sebastien,



             twice a day    M.D.



             as needed for    



             pain, avoid    



             with other    



             NSAIDs (not    



             taking)    

 

 Calcipotriene  2017 -  Hx  Ointment  0.005%  60gm  apply a thin  L40.50  
Mykel



   2018          layer to    Sebastien,



             affected    M.D.



             areas once or    



             twice a day    



             and rub in    



             gently and    



             completely    

 

 Provigil  2017 -  Hx  Tablets  100mg  30tabs  take one  G47.14  Mykel



   2017          capsule/table    Sebastien,



             t daily by    M.D.



             mouth as    



             needed for    



             hypersomnia    

 

 Salivasure  2017 -  Hx  Lozenges    60units  use daily as  M79.1  Mykel



   2017          needed for    Sebastien,



             dryness of    M.D.



             the mouth    

 

 Estazolam  10/18/2016 -  Hx  Tablets  1mg  14tabs  1 po at  G47.00  Pat



   2017          bedtime x 3    Ina, RENE,



             nights then    NICOLETTE COLLINS-BC



             off of 3    



             nights may    



             repeat weekly    

 

 Trazodone HCL  10/18/2016 -  Hx  Tablets  50mg  30tabs  Take One  M06.4  Mykel



   2018          Tablet By    Sebastien



             Mouth AT    M.D.



             Bedtime as    



             Needed For    



             Insomnia    

 

 Diclofenac  2016 -  Hx  Tablets DR  75mg  60tabs  Take One    Mykel



 Sodium  2017          Tablet By    Sebastien



             Mouth Twice A    M.D.



             Day as Needed    



             For Pain    



             Avoid Other    



             NSAIDS    

 

 Folic Acid  2016 -  Hx  Tablets  1mg    1 by mouth    Unknown



   2016          every day    

 

 Meloxicam  2016 -  Hx  Tablets  7.5mg    take 1 tab by    Unknown



   2016          mouth qdaily    



             with food    

 

 Plaquenil  2016 -  Hx  Tablets  200mg    2 by mouth    Unknown



   2017          daily    

 

 Lexapro  2016 -  Hx  Tablets  20mg    1 by mouth    Unknown



   2017          every day    

 

 Cymbalta  2016 -  Hx  Caps DR  60mg    1 by mouth    Unknown



   2017    Part      every day    

 

 Methotrexate  2016 -  Hx  Tablets  2.5mg    4 tabs by    Unknown



   2016          mouth every    



             week    







Medications Administered in Office







 Medication  Date  Status  Form  Strength  Qnty  SIG  Indications  Ordering



                 Provider

 

 No Injection  20  Administered  Injection          Mykel Crowe M.D.

 

 No Injection  20  Administered  Injection          Mykel Crowe M.D.







Immunizations







 CPT Code  Status  Date  Vaccine  Reaction  Lot #

 

 [id number]  Given  08/15/2018  Pneumococcal Conjugate  no immediate reaction  [id number]



       Vaccine 13 Valent For  noted  



       Intramuscular Use    

 

 [id number]  Given  10/23/2017  Influenza Virus Vaccine,    [id number]



       Quadrivalent, Split,    



       Preservative Free    

 

 85497  Given  10/10/2016  Influenza Virus Vaccine,    



       Quadrivalent, Split,    



       Preservative Free    







Vital Signs







 Date  Vital  Result  Comment

 

 2019 11:05am  Height  67.5 inches  5'7.50"









 Weight  150.00 lb  

 

 BP Systolic Sitting  100 mmHg  

 

 BP Diastolic Sitting  60 mmHg  

 

 Pain Level  8  

 

 BMI (Body Mass Index)  23.1 kg/m2  









 2019  3:28pm  Height  66 inches  5'6"









 Weight  150.00 lb  

 

 Heart Rate  80 /min  

 

 BP Systolic  120 mmHg  

 

 BP Diastolic  64 mmHg  

 

 Pain Level  9  

 

 O2 % BldC Oximetry  98 %  

 

 BMI (Body Mass Index)  24.2 kg/m2  









 2018 11:05am  Height  66 inches  5'6"









 Weight  143.00 lb  

 

 Heart Rate  84 /min  

 

 BP Systolic Sitting  108 mmHg  

 

 BP Diastolic Sitting  72 mmHg  

 

 Pain Level  9  

 

 BMI (Body Mass Index)  23.1 kg/m2  









 2018  3:50pm  Height  66 inches  5'6"









 Weight  141.00 lb  

 

 Heart Rate  113 /min  

 

 BP Systolic Sitting  140 mmHg  

 

 BP Diastolic Sitting  67 mmHg  

 

 Respiratory Rate  14 /min  

 

 Pain Level  10  

 

 BMI (Body Mass Index)  22.8 kg/m2  









 08/15/2018  3:25pm  Height  66 inches  5'6"









 Weight  146.00 lb  

 

 Heart Rate  96 /min  

 

 BP Systolic Sitting  124 mmHg  

 

 BP Diastolic Sitting  77 mmHg  

 

 Respiratory Rate  14 /min  

 

 Pain Level  7  

 

 BMI (Body Mass Index)  23.6 kg/m2  









 2018 11:32am  Height  66 inches  5'6"









 Weight  146.00 lb  

 

 Heart Rate  105 /min  

 

 BP Systolic Sitting  124 mmHg  

 

 BP Diastolic Sitting  82 mmHg  

 

 Respiratory Rate  14 /min  

 

 Pain Level  7  

 

 BMI (Body Mass Index)  23.6 kg/m2  









 2018  8:59am  Height  66 inches  5'6"









 Heart Rate  103 /min  

 

 BP Systolic Sitting  117 mmHg  

 

 BP Diastolic Sitting  78 mmHg  

 

 Respiratory Rate  14 /min  

 

 Pain Level  9  









 2017  1:00pm  Height  66 inches  5'6"









 Weight  158.00 lb  

 

 Heart Rate  100 /min  

 

 BP Systolic Sitting  90 mmHg  

 

 BP Diastolic Sitting  58 mmHg  

 

 Respiratory Rate  14 /min  

 

 Pain Level  7  

 

 BMI (Body Mass Index)  25.5 kg/m2  









 10/23/2017  3:05pm  Height  66 inches  5'6"









 Weight  158.19 lb  

 

 Heart Rate  105 /min  

 

 BP Systolic Sitting  121 mmHg  

 

 BP Diastolic Sitting  80 mmHg  

 

 Respiratory Rate  14 /min  

 

 Pain Level  7  

 

 BMI (Body Mass Index)  25.5 kg/m2  









 2017  9:17am  Height  66 inches  5'6"









 Weight  163.00 lb  

 

 Heart Rate  100 /min  

 

 BP Systolic Sitting  110 mmHg  

 

 BP Diastolic Sitting  70 mmHg  

 

 Respiratory Rate  14 /min  

 

 Pain Level  8  

 

 BMI (Body Mass Index)  26.3 kg/m2  









 2017 11:47am  Height  66 inches  5'6"









 Weight  166.00 lb  

 

 Heart Rate  101 /min  

 

 BP Systolic Sitting  124 mmHg  

 

 BP Diastolic Sitting  73 mmHg  

 

 Body Temperature  98.4 F  

 

 Pain Level  8  

 

 BMI (Body Mass Index)  26.8 kg/m2  









 2017  2:37pm  Height  66 inches  5'6"









 Weight  170.38 lb  

 

 Heart Rate  80 /min  

 

 BP Systolic Sitting  118 mmHg  

 

 BP Diastolic Sitting  80 mmHg  

 

 Respiratory Rate  14 /min  

 

 Body Temperature  96.8 F  

 

 BMI (Body Mass Index)  27.5 kg/m2  









 10/18/2016  3:05pm  Height  66 inches  5'6"









 Weight  171.00 lb  

 

 Heart Rate  89 /min  

 

 BP Systolic Sitting  138 mmHg  

 

 BP Diastolic Sitting  78 mmHg  

 

 Respiratory Rate  16 /min  

 

 O2 % BldC Oximetry  98 %  

 

 BMI (Body Mass Index)  27.6 kg/m2  









 10/18/2016  2:02pm  Height  68 inches  5'8"









 Weight  173.00 lb  

 

 Heart Rate  80 /min  

 

 BP Systolic Sitting  120 mmHg  

 

 BP Diastolic Sitting  60 mmHg  

 

 Respiratory Rate  14 /min  

 

 Body Temperature  98.0 F  

 

 Pain Level  5  

 

 BMI (Body Mass Index)  26.3 kg/m2  









 2016  2:00pm  Height  68 inches  5'8"









 Weight  172.12 lb  

 

 Heart Rate  72 /min  

 

 BP Systolic Sitting  142 mmHg  

 

 BP Diastolic Sitting  80 mmHg  

 

 Respiratory Rate  14 /min  

 

 Body Temperature  96.0 F  

 

 Pain Level  7  

 

 BMI (Body Mass Index)  26.2 kg/m2  









 2016  1:43pm  Height  68 inches  5'8"









 Weight  174.00 lb  

 

 Heart Rate  84 /min  

 

 BP Systolic Sitting  120 mmHg  

 

 BP Diastolic Sitting  84 mmHg  

 

 Respiratory Rate  14 /min  

 

 Body Temperature  98.1 F  

 

 Pain Level  8  

 

 BMI (Body Mass Index)  26.5 kg/m2  









 2016  2:17pm  Height  68 inches  5'8"









 Weight  174.00 lb  

 

 Heart Rate  82 /min  

 

 BP Systolic Sitting  126 mmHg  

 

 BP Diastolic Sitting  86 mmHg  

 

 Respiratory Rate  14 /min  

 

 Body Temperature  96.7 F  

 

 Pain Level  8  

 

 BMI (Body Mass Index)  26.5 kg/m2  









 2016 11:54am  Height  68 inches  5'8"









 Weight  170.00 lb  

 

 Heart Rate  78 /min  

 

 BP Systolic  124 mmHg  

 

 BP Diastolic  74 mmHg  

 

 Respiratory Rate  14 /min  

 

 O2 % BldC Oximetry  98 %  

 

 BMI (Body Mass Index)  25.8 kg/m2  

 

 Neck Circumference in inches  15  







Results







 Test  Date  Facility  Test  Result  H/L  Range  Note

 

 Quantiferon Gold TB  10/26/2018  [facility]  QuantiFERON  Negative 
   Negative  1



     [address]  -Tb Gold        



     [place] [address]  Plus        



     [phone]          









 TB1 Ag minus Nil Result  0 IU/mL      

 

 TB2 Ag minus Nil Result  0 IU/mL      

 

 TB Mitogen minus Nil Result  8.53 IU/mL      

 

 TB Nil Result  0.01 IU/mL      2









 Laboratory test  10/12/2018  [facility]  C Reactive  < 1.00  N  <
8.01  3



 finding    [address]  Protein  mg/L      



     [place], [place] [id number] [phone]          

 

 Basic Metabolic  10/12/2018  [facility]  Sodium  139 mmol/L  N  135-
145  



 Panel    [address]          



     [place] [address] [phone]          









 Potassium  4.3 mmol/L  N  3.5-5.0  

 

 Chloride  105 mmol/L  N  101-111  

 

 Co2 Carbon Dioxide  27 mmol/L  N  22-32  

 

 Anion Gap  7 mmol/L  N  2-11  

 

 Glucose  83 mg/dL  N    

 

 Blood Urea Nitrogen  10 mg/dL  N  6-24  

 

 Creatinine  0.69 mg/dL  N  0.51-0.95  

 

 BUN/Creatinine Ratio  14.5  N  8-20  

 

 Calcium  9.9 mg/dL  N  8.6-10.3  

 

 Egfr Non-  93.8    >60  

 

 Egfr   113.4    >60  4









 Laboratory test finding  10/12/2018  [facility]  Cortisol  7.70 g/
dL      5



     [address]          



     [place] [address] [phone]          









 Thyroperoxidase AB  0.70 IU/mL  N  <9  6









 Lyme Western  2018  [facility]  Lyme Disease  Negative    
Negative  



 Blot    [address]  IgG Ab WB        



     [place] [address] [phone]          









 Lyme Disease IgG Bands Present  No bands detecte <SEE NOTE> kDa      7

 

 Lyme Disease IgM Ab WB  Negative    Negative  

 

 Lyme Disease IgM Bands Present  No bands detecte <SEE NOTE> kDa      8

 

 Lyme Disease Interpretation  See Comment      9









 Laboratory test  2018  [facility]  Erythrocyte Sed  13 mm/Hr  
N  0-14  10



 finding    [address]  Rate        



     [place] [address] [phone]          









 C Reactive Protein  1.65 mg/L  N  <8.01  11









 CBC Auto Diff  2018  [facility]  White Blood  8.3 10^3/uL  N  
3.5-10.8  



     [address]  Count        



     [place] [address] [phone]          









 Red Blood Count  4.29 10^6/uL  N  4.00-5.40  

 

 Hemoglobin  14.5 g/dL  N  12.0-16.0  

 

 Hematocrit  42 %  N  35-47  

 

 Mean Corpuscular Volume  97 fL  N  80-97  

 

 Mean Corpuscular Hemoglobin  34 pg  High  27-31  

 

 Mean Corpuscular HGB Conc  35 g/dL  N  31-36  

 

 Red Cell Distribution Width  13 %  N  10.5-15  

 

 Platelet Count  308 10^3/uL  N  150-450  

 

 Mean Platelet Volume  7.8 um3  N  7.4-10.4  

 

 Abs Neutrophils  3.7 10^3/uL  N  1.5-7.7  

 

 Abs Lymphocytes  3.5 10^3/uL  N  1.0-4.8  

 

 Abs Monocytes  0.8 10^3/uL  N  0-0.8  

 

 Abs Eosinophils  0.3 10^3/uL  N  0-0.6  

 

 Abs Basophils  0.1 10^3/uL  N  0-0.2  

 

 Abs Nucleated RBC  0 10^3/uL      

 

 Granulocyte %  44.7 %  N  38-83  

 

 Lymphocyte %  42.2 %  N  25-47  

 

 Monocyte %  9.0 %  High  0-7  

 

 Eosinophil %  3.1 %  N  0-6  

 

 Basophil %  1.0 %  N  0-2  

 

 Nucleated Red Blood Cells %  0.1      









 Comp Metabolic Panel  2018  [facility]  Sodium  140 mmol/L  N
  135-145  



     [address]          



     [place], [place] [id number] [phone]          









 Chloride  103 mmol/L  N  101-111  

 

 Co2 Carbon Dioxide  30 mmol/L  N  22-32  

 

 Glucose  65 mg/dL  Low    

 

 Blood Urea Nitrogen  7 mg/dL  N  6-24  

 

 Creatinine  0.69 mg/dL  N  0.51-0.95  

 

 BUN/Creatinine Ratio  10.1  N  8-20  

 

 Calcium  9.7 mg/dL  N  8.6-10.3  

 

 Total Protein  7.0 g/dL  N  6.4-8.9  

 

 Albumin  4.4 g/dL  N  3.2-5.2  

 

 Globulin  2.6 g/dL  N  2-4  

 

 Albumin/Globulin Ratio  1.7  N  1-3  

 

 Total Bilirubin  0.30 mg/dL  N  0.2-1.0  

 

 Alkaline Phosphatase  42 U/L  N    

 

 Alt  9 U/L  N  7-52  

 

 Ast  14 U/L  N  13-39  

 

 Egfr Non-  93.8    >60  

 

 Egfr   113.4    >60  12

 

 Potassium  5.2 mmol/L  High  3.5-5.0  

 

 Anion Gap  7 mmol/L  N  2-11  









 Laboratory test  2018  [facility]  TSH (Thyroid  1.83 mcIU/mL
  N  0.34-5.60  13



 finding    [address]  Stim Horm)        



     [place] [address] [phone]          









 Folic Acid (Folate)  8.22 ng/mL    >3.99  14

 

 Vitamin B12  344 pg/mL  N  180-914  15

 

 Vitamin D, 1,25 Dihydroxy  19 pg/mL    18-78  16









 Laboratory test  2018  [facility]  Erythrocyte Sed  13 mm/Hr  
N  0-14  17



 finding    [address]  Rate        



     [place] [address] [phone]          









 C Reactive Protein  1.65 mg/L  N  <8.01  18

 

 TSH (Thyroid Stim Horm)  1.83 mcIU/mL  N  0.34-5.60  19









 Vitamin B12 And  2018  [facility]  Vitamin B12  344 pg/mL  N  
180-914  20



 Folate Serum    [address]          



     [place] [address] [phone]          









 Folic Acid (Folate)  8.22 ng/mL    >3.99  21









 Laboratory test  2018  [facility]  Vitamin D,  19 pg/mL      22



 finding    [address]  1,25 Dihydroxy        



     [place] [address] [phone]          

 

 CBC Auto Diff  2018  [facility]  White Blood  8.3  N  3.5-
10.8  



     [address]  Count  10^3/uL      



     [place] [address] [phone]          









 Red Blood Count  4.29 10^6/uL  N  4.00-5.40  

 

 Hemoglobin  14.5 g/dL  N  12.0-16.0  

 

 Hematocrit  42 %  N  35-47  

 

 Mean Corpuscular Volume  97 fL  N  80-97  

 

 Mean Corpuscular Hemoglobin  34 pg  High  27-31  

 

 Mean Corpuscular HGB Conc  35 g/dL  N  31-36  

 

 Red Cell Distribution Width  13 %  N  10.5-15  

 

 Platelet Count  308 10^3/uL  N  150-450  

 

 Mean Platelet Volume  7.8 um3  N  7.4-10.4  

 

 Abs Neutrophils  3.7 10^3/uL  N  1.5-7.7  

 

 Abs Lymphocytes  3.5 10^3/uL  N  1.0-4.8  

 

 Abs Monocytes  0.8 10^3/uL  N  0-0.8  

 

 Abs Eosinophils  0.3 10^3/uL  N  0-0.6  

 

 Abs Basophils  0.1 10^3/uL  N  0-0.2  

 

 Abs Nucleated RBC  0 10^3/uL      

 

 Granulocyte %  44.7 %  N  38-83  

 

 Lymphocyte %  42.2 %  N  25-47  

 

 Monocyte %  9.0 %  High  0-7  

 

 Eosinophil %  3.1 %  N  0-6  

 

 Basophil %  1.0 %  N  0-2  

 

 Nucleated Red Blood Cells %  0.1      









 Comp Metabolic Panel  2018  [facility]  Sodium  140 mmol/L  N
  135-145  



     [address]          



     [place] [address] [phone]          









 Chloride  103 mmol/L  N  101-111  

 

 Co2 Carbon Dioxide  30 mmol/L  N  22-32  

 

 Glucose  65 mg/dL  Low    

 

 Blood Urea Nitrogen  7 mg/dL  N  6-24  

 

 Creatinine  0.69 mg/dL  N  0.51-0.95  

 

 BUN/Creatinine Ratio  10.1  N  8-20  

 

 Calcium  9.7 mg/dL  N  8.6-10.3  

 

 Total Protein  7.0 g/dL  N  6.4-8.9  

 

 Albumin  4.4 g/dL  N  3.2-5.2  

 

 Globulin  2.6 g/dL  N  2-4  

 

 Albumin/Globulin Ratio  1.7  N  1-3  

 

 Total Bilirubin  0.30 mg/dL  N  0.2-1.0  

 

 Alkaline Phosphatase  42 U/L  N    

 

 Alt  9 U/L  N  7-52  

 

 Ast  14 U/L  N  13-39  

 

 Egfr Non-  93.8    >60  

 

 Egfr   113.4    >60  23

 

 Potassium  5.2 mmol/L  High  3.5-5.0  

 

 Anion Gap  7 mmol/L  N  2-11  









 Laboratory test  2018  [facility]  C Reactive  < 1.00  N  < 
5.00  24



 finding    [address]  Protein  mg/L      



     [place] [address] [phone]          

 

 Comp Metabolic  2018  [facility]  Sodium  139 mmol/L  N  133-
145  



 Panel    [address]          



     [place] [address] [phone]          









 Potassium  4.5 mmol/L  N  3.5-5.0  

 

 Chloride  104 mmol/L  N  101-111  

 

 Co2 Carbon Dioxide  31 mmol/L  N  22-32  

 

 Anion Gap  4 mmol/L  N  2-11  

 

 Glucose  69 mg/dL  Low    

 

 Blood Urea Nitrogen  9 mg/dL  N  6-24  

 

 Creatinine  0.75 mg/dL  N  0.51-0.95  

 

 BUN/Creatinine Ratio  12.0  N  8-20  

 

 Calcium  9.7 mg/dL  N  8.6-10.3  

 

 Total Protein  7.0 g/dL  N  6.4-8.9  

 

 Albumin  4.4 g/dL  N  3.2-5.2  

 

 Globulin  2.6 g/dL  N  2-4  

 

 Albumin/Globulin Ratio  1.7  N  1-3  

 

 Total Bilirubin  0.50 mg/dL  N  0.2-1.0  

 

 Alkaline Phosphatase  44 U/L  N    

 

 Alt  8 U/L  N  7-52  

 

 Ast  13 U/L  N  13-39  

 

 Egfr Non-  85.6    >60  

 

 Egfr   110.1    >60  25









 CBC Auto Diff  2018  [facility]  White Blood  8.0 10^3/uL  N  
3.5-10.8  



     [address]  Count        



     [place] [address] [phone]          









 Red Blood Count  4.25 10^6/uL  N  4.0-5.4  

 

 Hemoglobin  14.2 g/dL  N  12.0-16.0  

 

 Hematocrit  41 %  N  35-47  

 

 Mean Corpuscular Volume  97 fL  N  80-97  

 

 Mean Corpuscular Hemoglobin  33 pg  High  27-31  

 

 Mean Corpuscular HGB Conc  34 g/dL  N  31-36  

 

 Red Cell Distribution Width  13 %  N  10.5-15  

 

 Platelet Count  281 10^3/uL  N  150-450  

 

 Mean Platelet Volume  8 um3  N  7.4-10.4  

 

 Abs Neutrophils  4.2 10^3/uL  N  1.5-7.7  

 

 Abs Lymphocytes  2.9 10^3/uL  N  1.0-4.8  

 

 Abs Monocytes  0.5 10^3/uL  N  0-0.8  

 

 Abs Eosinophils  0.3 10^3/uL  N  0-0.6  

 

 Abs Basophils  0.1 10^3/uL  N  0-0.2  

 

 Abs Nucleated RBC  0 10^3/uL      

 

 Granulocyte %  52.2 %  N  38-83  

 

 Lymphocyte %  37.0 %  N  25-47  

 

 Monocyte %  6.7 %  N  1-9  

 

 Eosinophil %  3.2 %  N  0-6  

 

 Basophil %  0.9 %  N  0-2  

 

 Nucleated Red Blood Cells %  0.1      









 Laboratory test  2018  [facility]  Erythrocyte Sed  20 mm/Hr  
High  0-14  



 finding    [address]  Rate        



     [place] [address] [phone]          

 

 CBC Auto Diff  10/10/2017  [facility]  White Blood  7.4  N  3.5-
10.8  



     [address]  Count  10^3/uL      



     [place] [address] [phone]          









 Red Blood Count  4.20 10^6/uL  N  4.0-5.4  

 

 Hemoglobin  13.5 g/dL  N  12.0-16.0  

 

 Hematocrit  39 %  N  35-47  

 

 Mean Corpuscular Volume  94 fL  N  80-97  

 

 Mean Corpuscular Hemoglobin  32 pg  High  27-31  

 

 Mean Corpuscular HGB Conc  34 g/dL  N  31-36  

 

 Red Cell Distribution Width  13 %  N  10.5-15  

 

 Platelet Count  273 10^3/uL  N  150-450  

 

 Mean Platelet Volume  8 um3  N  7.4-10.4  

 

 Abs Neutrophils  4.2 10^3/uL  N  1.5-7.7  

 

 Abs Lymphocytes  2.6 10^3/uL  N  1.0-4.8  

 

 Abs Monocytes  0.5 10^3/uL  N  0-0.8  

 

 Abs Eosinophils  0 10^3/uL  N  0-0.6  

 

 Abs Basophils  0.1 10^3/uL  N  0-0.2  

 

 Abs Nucleated RBC  0.01 10^3/uL  N    

 

 Granulocyte %  56.2 %  N  38-83  

 

 Lymphocyte %  35.4 %  N  25-47  

 

 Monocyte %  6.9 %  N  1-9  

 

 Eosinophil %  0.4 %  N  0-6  

 

 Basophil %  1.1 %  N  0-2  

 

 Nucleated Red Blood Cells %  0.1  N    









 Comp Metabolic Panel  10/10/2017  [facility]  Sodium  135 mmol/L  N
  133-145  



     [address]          



     [place] [address] [phone]          









 Potassium  4.1 mmol/L  N  3.5-5.0  

 

 Chloride  102 mmol/L  N  101-111  

 

 Co2 Carbon Dioxide  29 mmol/L  N  22-32  

 

 Anion Gap  4 mmol/L  N  2-11  

 

 Glucose  101 mg/dL  High    

 

 Blood Urea Nitrogen  12 mg/dL  N  6-24  

 

 Creatinine  0.69 mg/dL  N  0.51-0.95  

 

 BUN/Creatinine Ratio  17.4  N  8-20  

 

 Calcium  9.1 mg/dL  N  8.6-10.3  

 

 Total Protein  6.8 g/dL  N  6.4-8.9  

 

 Albumin  4.1 g/dL  N  3.2-5.2  

 

 Globulin  2.7 g/dL  N  2-4  

 

 Albumin/Globulin Ratio  1.5  N  1-3  

 

 Total Bilirubin  0.30 mg/dL  N  0.2-1.0  

 

 Alkaline Phosphatase  42 U/L  N    

 

 Alt  7 U/L  N  7-52  

 

 Ast  12 U/L  Low  13-39  

 

 Egfr Non-  94.2  N  >60  

 

 Egfr   121.2  N  >60  26









 Laboratory test  10/10/2017  [facility]  Creatine  54 U/L  N  10-
223  27



 finding    [address]  Kinase(CK)        



     [place] [address] [phone]          









 C Reactive Protein  2.57 mg/L  N  < 5.00  28









 Laboratory test  2017  [facility]  Erythrocyte Sed  28 mm/Hr  
High  0-14  29



 finding    [address]  Rate        



     [place] [address] [phone]          









 C Reactive Protein  3.97 mg/L  N  < 5.00  30









 Ssa/SSB Abs Igg  2017  [facility]  SS-A/Ro Antibody  <0.2 U  
N    31



     [address]          



     [place] [address] [phone]          









 SS-B/La Antibody  <0.2 U  N    32









 Laboratory test  2017  [facility]  Hemoglobin A1c  5.2 %  N  
Less than  33



 finding    [address]  (Glyco HGB)      6.0  



     [place] [address] [phone]          

 

 Laboratory test  2017  [facility]  C Reactive  < 1.00  N  < 
5.00  34



 finding    [address]  Protein  mg/L      



     [place] [address] [phone]          









 Erythrocyte Sed Rate  13 mm/Hr  N  0-14  









 CBC W/Auto  2017  [facility]  White Blood  9.6 10^3/uL  N  3.5
-10.8  



 Diff    [address]  Count        



     [place] [address] [phone]          









 Red Blood Count  4.51 10^6/uL  N  4.0-5.4  

 

 Hemoglobin  14.3 g/dL  N  12.0-16.0  

 

 Hematocrit  42 %  N  35-47  

 

 Mean Corpuscular Volume  94 fL  N  80-97  

 

 Mean Corpuscular Hemoglobin  32 pg  High  27-31  

 

 Mean Corpuscular HGB Conc  34 g/dL  N  31-36  

 

 Red Cell Distribution Width  14 %  N  10.5-15  

 

 Platelet Count  281 10^3/uL  N  150-450  

 

 Mean Platelet Volume  8 um3  N  7.4-10.4  

 

 Abs Neutrophils  5.0 10^3/uL  N  1.5-7.7  

 

 Abs Lymphocytes  3.5 10^3/uL  N  1.0-4.8  

 

 Abs Monocytes  0.7 10^3/uL  N  0-0.8  

 

 Abs Eosinophils  0.3 10^3/uL  N  0-0.6  

 

 Abs Basophils  0.1 10^3/uL  N  0-0.2  

 

 Abs Nucleated RBC  0.01 10^3/uL  N    

 

 Granulocyte %  52.0 %  N  38-83  

 

 Lymphocyte %  36.3 %  N  25-47  

 

 Monocyte %  7.8 %  N  1-9  

 

 Eosinophil %  3.1 %  N  0-6  

 

 Basophil %  0.8 %  N  0-2  

 

 Nucleated Red Blood Cells %  0.1  N    









 CMP Panel  2017  [facility]  Sodium  136 mmol/L  N  133-145  



     [address]          



     [place] [address] [phone]          









 Potassium  3.8 mmol/L  N  3.5-5.0  

 

 Chloride  103 mmol/L  N  101-111  

 

 Co2 Carbon Dioxide  28 mmol/L  N  22-32  

 

 Anion Gap  5 mmol/L  N  2-11  

 

 Glucose  107 mg/dL  High    

 

 Blood Urea Nitrogen  9 mg/dL  N  6-24  

 

 Creatinine  0.68 mg/dL  N  0.51-0.95  

 

 BUN/Creatinine Ratio  13.2  N  8-20  

 

 Calcium  9.7 mg/dL  N  8.6-10.3  

 

 Total Protein  7.2 g/dL  N  6.4-8.9  

 

 Albumin  4.5 g/dL  N  3.2-5.2  

 

 Globulin  2.7 g/dL  N  2-4  

 

 Albumin/Globulin Ratio  1.7  N  1-3  

 

 Total Bilirubin  0.40 mg/dL  N  0.2-1.0  

 

 Alkaline Phosphatase  49 U/L  N    

 

 Alt  8 U/L  N  7-52  

 

 Ast  12 U/L  Low  13-39  

 

 Egfr Non-  96.3  N  >60  

 

 Egfr   123.9  N  >60  35









 Laboratory test  2016  [facility]  Rheumatoid Factor  <15 IU/
mL  N  <15  36



 finding    [address]          



     [place] [address] [phone]          









 Cyclic Citrullinated Pep Igg  <15.6 U  N    37









 Laboratory test  2016  [facility]  Creatine  81 U/L  N  10-
223  38



 finding    [address]  Kinase(CK)        



     [place] [address] [phone]          

 

 Laboratory test  2016  [facility]  Erythrocyte Sed  21 mm/Hr  
High  0-14  39



 finding    [address]  Rate        



     [place] [address] [phone]          









 C Reactive Protein  2.12 mg/L  N  < 5.00  40









 CBC Auto Diff  2016  [facility]  White Blood  8.6 10^3/uL  N  
3.5-10.8  



     [address]  Count        



     [place] [address] [phone]          









 Red Blood Count  4.44 10^6/uL  N  4.0-5.4  

 

 Hemoglobin  14.4 g/dL  N  12.0-16.0  

 

 Hematocrit  41 %  N  35-47  

 

 Mean Corpuscular Volume  93 fL  N  80-97  

 

 Mean Corpuscular Hemoglobin  33 pg  High  27-31  

 

 Mean Corpuscular HGB Conc  35 g/dL  N  31-36  

 

 Red Cell Distribution Width  13 %  N  10.5-15  

 

 Platelet Count  337 10^3/uL  N  150-450  

 

 Mean Platelet Volume  8 um3  N  7.4-10.4  

 

 Abs Neutrophils  4.7 10^3/uL  N  1.5-7.7  

 

 Abs Lymphocytes  3.1 10^3/uL  N  1.0-4.8  

 

 Abs Monocytes  0.5 10^3/uL  N  0-0.8  

 

 Abs Eosinophils  0.2 10^3/uL  N  0-0.6  

 

 Abs Basophils  0.1 10^3/uL  N  0-0.2  

 

 Abs Nucleated RBC  0 10^3/uL  N    

 

 Granulocyte %  54.7 %  N  38-83  

 

 Lymphocyte %  35.5 %  N  25-47  

 

 Monocyte %  6.1 %  N  1-9  

 

 Eosinophil %  2.5 %  N  0-6  

 

 Basophil %  1.2 %  N  0-2  

 

 Nucleated Red Blood Cells %  0  N    









 Comp Metabolic Panel  2016  [facility]  Sodium  137 mmol/L  N
  133-145  



     [address]          



     [place] [address] [phone]          









 Potassium  4.2 mmol/L  N  3.5-5.0  

 

 Chloride  103 mmol/L  N  101-111  

 

 Co2 Carbon Dioxide  29 mmol/L  N  22-32  

 

 Anion Gap  5 mmol/L  N  2-11  

 

 Glucose  101 mg/dL  High    

 

 Blood Urea Nitrogen  10 mg/dL  N  6-24  

 

 Creatinine  0.72 mg/dL  N  0.51-0.95  

 

 BUN/Creatinine Ratio  13.9  N  8-20  

 

 Calcium  9.7 mg/dL  N  8.6-10.3  

 

 Total Protein  7.2 g/dL  N  6.4-8.9  

 

 Albumin  4.3 g/dL  N  3.2-5.2  

 

 Globulin  2.9 g/dL  N  2-4  

 

 Albumin/Globulin Ratio  1.5  N  1-3  

 

 Total Bilirubin  0.40 mg/dL  N  0.2-1.0  

 

 Alkaline Phosphatase  45 U/L  N    

 

 Alt  10 U/L  N  7-52  

 

 Ast  12 U/L  Low  13-39  

 

 Egfr Non-  90.2  N  >60  

 

 Egfr   116.0  N  >60  41









 Laboratory test  2016  [facility]  TSH (Thyroid  1.89 mcIU/mL
  N  0.34-5.60  42



 finding    [address]  Stim Horm)        



     [place] [address] [phone]          









 Vitamin B12  410 pg/mL  N  180-914  43

 

 Lyme Disease Serology  Negative  N  Negative  44









 Laboratory test  2016  [facility]  Creatine  72 U/L  N  10-
223  45



 finding    [address]  Kinase(CK)        



     [place] [address] [phone]          









 Aldolase  5.8 U/L  N  <7.7  46

 

 Cortisol  2.51 ?g/dL  N    47









 Hla B27  2016  [facility]  Hla B27  Negative  N    48



     [address]          



     [place] [address] [phone]          









 Hla B27 Interp  See Comment  N    49









 Laboratory test  2016  [facility]  Lyme Disease  Negative  N  
Negative  50



 finding    [address]  PCR        



     [place] [address] [phone]          









 Cyclic Citrullinated Pep Igg  TNP  N    51

 

 Erythrocyte Sed Rate  15 mm/Hr  High  0-14  52









 Celiac Panel  2016  [facility]  Tissue Transglutaminase  <1.2 
U/mL  N    53



     [address]  IgA Ab        



     [place] [address]          



     [phone]          









 Immunoglobulin A  384 mg/dL  Abnormal  61 - 356  

 

 Celiac Interpretation  See Comment  N    54









 Connective Tissue  2016  [facility]  Anti-Nuclear  0.4 U  N  
  55



 Panel    [address]  Antibody        



     [place] [address] [phone]          









 Cyclic Citrullinated Peptide  <15.6 U  N    56

 

 Interpretation  See Comment  N    57









 Iron & Iron Binding  2016  [facility]  Iron  70 g/dL  N  50-
212  



 Capacity    [address] [place] [address] [phone]          









 Unsaturated Iron Binding  307 g/dL  N    

 

 Total Iron Binding Capacity  377 g/dL  N  250-450  

 

 % Iron Saturation  19 %  N  15-55  









 Hepatitis  2016  [facility]  Hepatitis B  Nonreactive  N  
Nonreactive  



 Acute Panel    [address] [facility]  Surface        



     [place] [address]  Antigen        



     [phone]          









 Hepatitis B Core IgM  Nonreactive  N  Nonreactive  

 

 Hepatitis C Antibody  Nonreactive  N  Nonreactive  

 

 Hepatitis A AB IgM  Nonreactive  N  Nonreactive  









 Laboratory test  2016  [facility]  Magnesium  2.0 mg/dL  N  
1.9-2.7  58



 finding    [address] [place], [place] [address] [phone]          









 Vitamin B12  504 pg/mL  N  180-914  59









 Celiac Hla DQ1/DQ2  2016  [facility]  Hla-Dqa1  SEE BELOW  N 
   60



     [address] [place] [address] [phone]          









 Hla-DQB1  SEE BELOW  N    61

 

 Celiac Gene Pairs Present?  Yes  N    

 

 Celiac Gene Interpretation  See Comment  N    62









 Comp Metabolic Panel  2016  [facility]  Sodium  136 mmol/L  N
  133-145  



     [address] [place] [address] [phone]          









 Potassium  4.4 mmol/L  N  3.5-5.0  

 

 Chloride  101 mmol/L  N  101-111  

 

 Co2 Carbon Dioxide  27 mmol/L  N  22-32  

 

 Anion Gap  8 mmol/L  N  2-11  

 

 Glucose  79 mg/dL  N    

 

 Blood Urea Nitrogen  9 mg/dL  N  6-24  

 

 Creatinine  0.72 mg/dL  N  0.51-0.95  

 

 BUN/Creatinine Ratio  12.5  N  8-20  

 

 Calcium  9.0 mg/dL  N  8.6-10.3  

 

 Total Protein  6.8 g/dL  N  6.4-8.9  

 

 Albumin  4.2 g/dL  N  3.2-5.2  

 

 Globulin  2.6 g/dL  N  2-4  

 

 Albumin/Globulin Ratio  1.6  N  1-3  

 

 Total Bilirubin  0.30 mg/dL  N  0.2-1.0  

 

 Alkaline Phosphatase  49 U/L  N    

 

 Alt  74 U/L  High  7-52  

 

 Ast  44 U/L  High  13-39  

 

 Egfr Non-  90.2  N  >60  

 

 Egfr   116.0  N  >60  63









 CBC Auto Diff  2016  [facility]  White Blood  7.2 10^3/uL  N  
3.5-10.8  



     [address]  Count        



     [place] [address] [phone]          









 Red Blood Count  4.28 10^6/uL  N  4.0-5.4  

 

 Hemoglobin  13.7 g/dL  N  12.0-16.0  

 

 Hematocrit  40 %  N  35-47  

 

 Mean Corpuscular Volume  93 fL  N  80-97  

 

 Mean Corpuscular Hemoglobin  32 pg  High  27-31  

 

 Mean Corpuscular HGB Conc  34 g/dL  N  31-36  

 

 Red Cell Distribution Width  14 %  N  10.5-15  

 

 Platelet Count  278 10^3/uL  N  150-450  

 

 Mean Platelet Volume  8 um3  N  7.4-10.4  

 

 Abs Neutrophils  3.6 10^3/uL  N  1.5-7.7  

 

 Abs Lymphocytes  2.9 10^3/uL  N  1.0-4.8  

 

 Abs Monocytes  0.5 10^3/uL  N  0-0.8  

 

 Abs Eosinophils  0.1 10^3/uL  N  0-0.6  

 

 Abs Basophils  0.1 10^3/uL  N  0-0.2  

 

 Abs Nucleated RBC  0 10^3/uL  N    

 

 Granulocyte %  49.9 %  N  38-83  

 

 Lymphocyte %  39.7 %  N  25-47  

 

 Monocyte %  7.6 %  N  1-9  

 

 Eosinophil %  1.7 %  N  0-6  

 

 Basophil %  1.1 %  N  0-2  

 

 Nucleated Red Blood Cells %  0  N    









 Vitamin D 1,25  2016  [facility]  Vitamin D Total  34.5 ng/mL
  N  30-50  64



 And Vitamin D,2    [address]  25(Oh)        



     [place] [address] [phone]          









 Vitamin D, 1,25 Dihydroxy  57 pg/mL  N  18-78  65









 Laboratory test  2016  [facility]  Rheumatoid Factor  <15 IU/
mL  N  <15  66



 finding    [address]          



     [place] [address] [phone]          









 Uric Acid  4.8 mg/dL  N  2.3-6.6  67

 

 TSH (Thyroid Stim Horm)  2.73 mcIU/mL  N  0.34-5.60  68









 1  No interferon-gamma response to M. tuberculosis antigens



   was detected. Infection with M. tuberculosis is unlikely.



   A single negative result does not exclude infection with



   M. tuberculosis. In patients at high risk for M.tuberculosis



   infection, a second test should be considered in accordance



   with the 2017 ATS/IDSA/CDC Clinical Practice Guidelines



   for Diagnosis of Tuberculosis in Adults and Children



   [Rogerinsohn DM et. al. Clin. Infect. Dis.



   2017;64(2):111-115].

 

 2  Test Performed by:



   [facility]



   [address] [place] [address]

 

 3  Please check labs today

 

 4  *******Because ethnic data is not always readily available,



   this report includes an eGFR for both -Americans and



   non- Americans.****



   The National Kidney Disease Education Program (NKDEP) does



   not endorse the use of the MDRD equation for patients that



   are not between the ages of 18 and 70, are pregnant, have



   extremes of body size, muscle mass, or nutritional status,



   or are non- or non-.



   According to the National Kidney Foundation, irrespective of



   diagnosis, the stage of the disease is based on the level of



   kidney function:



   Stage Description                      GFR(mL/min/1.73 m(2))



   1     Kidney damage with normal or decreased GFR       90



   2     Kidney damage with mild decrease in GFR          60-89



   3     Moderate decrease in GFR                         30-59



   4     Severe decrease in GFR                           15-29



   5     Kidney failure                       <15 (or dialysis)

 

 5  AM 8.7-22.4



   PM <10

 

 6  Please check labs today

 

 7  No bands detected

 

 8  No bands detected

 

 9  Specific serologic response to B. burgdorferi infection is



   not detected, but cannot rule out early infection during



   which low or undetectable antibody levels to B. burgdorferi



   may be present.  If clinically indicated, a new serum



   specimen should be submitted in 7-14 days.



   -------------------ADDITIONAL INFORMATION-------------------



   Per CDC criteria, the Lyme IgG Immunoblot is interpreted as



   positive if IgG-class antibodies are detected to >=5 B.



   burgdorferi proteins, and the Lyme IgM Immunoblot is



   interpreted as positive if IgM-class antibodies are



   detected to >=2 B. burgdorferi proteins. Immunoblot



   patterns not meeting these criteria should not be



   interpreted as positive. Epitopes from certain B.



   burgdorferi proteins (e.g., p41) are conserved across other



   bacteria, which may lead to the detection of IgM- and/or



   IgG-class antibodies on the Lyme disease immunoblots in



   patients without Lyme disease. Immunoblot should only be



   ordered on specimens that are positive or equivocal by a



   FDA-licensed Lyme disease antibody screening test (e.g.,



   EIA). Results of the Lyme IgM immunoblot should not be



   considered in patients with >=30 days of symptoms.



   Test Performed by:



   [facility]



   [address] [place] [address]

 

 10  Please check this week

 

 11  Please check this week

 

 12  *******Because ethnic data is not always readily available,



   this report includes an eGFR for both -Americans and



   non- Americans.****



   The National Kidney Disease Education Program (NKDEP) does



   not endorse the use of the MDRD equation for patients that



   are not between the ages of 18 and 70, are pregnant, have



   extremes of body size, muscle mass, or nutritional status,



   or are non- or non-.



   According to the National Kidney Foundation, irrespective of



   diagnosis, the stage of the disease is based on the level of



   kidney function:



   Stage Description                      GFR(mL/min/1.73 m(2))



   1     Kidney damage with normal or decreased GFR       90



   2     Kidney damage with mild decrease in GFR          60-89



   3     Moderate decrease in GFR                         30-59



   4     Severe decrease in GFR                           15-29



   5     Kidney failure                       <15 (or dialysis)

 

 13  Please check this week

 

 14  Please check this week

 

 15  Normal Range 180 to 914



   Indeterminate Range 145 to 180



   Deficient Range  <145

 

 16  -------------------ADDITIONAL INFORMATION-------------------



   This test was developed and its performance characteristics



   determined by [facility] in a manner consistent with CLIA



   requirements. This test has not been cleared or approved by



   the U.S. Food and Drug Administration.



   Test Performed by:



   [facility] - [address] [address]

 

 17  Please check this week

 

 18  Please check this week

 

 19  Please check this week

 

 20  Normal Range 180 to 914



   Indeterminate Range 145 to 180



   Deficient Range  <145

 

 21  Please check this week

 

 22  -------------------ADDITIONAL INFORMATION-------------------



   This test was developed and its performance characteristics



   determined by [facility] in a manner consistent with CLIA



   requirements. This test has not been cleared or approved by



   the U.S. Food and Drug Administration.



   Test Performed by:



   [facility] - [address] [address]

 

 23  *******Because ethnic data is not always readily available,



   this report includes an eGFR for both -Americans and



   non- Americans.****



   The National Kidney Disease Education Program (NKDEP) does



   not endorse the use of the MDRD equation for patients that



   are not between the ages of 18 and 70, are pregnant, have



   extremes of body size, muscle mass, or nutritional status,



   or are non- or non-.



   According to the National Kidney Foundation, irrespective of



   diagnosis, the stage of the disease is based on the level of



   kidney function:



   Stage Description                      GFR(mL/min/1.73 m(2))



   1     Kidney damage with normal or decreased GFR       90



   2     Kidney damage with mild decrease in GFR          60-89



   3     Moderate decrease in GFR                         30-59



   4     Severe decrease in GFR                           15-29



   5     Kidney failure                       <15 (or dialysis)

 

 24  Acute inflammation:  >10.00

 

 25  *******Because ethnic data is not always readily available,



   this report includes an eGFR for both -Americans and



   non- Americans.****



   The National Kidney Disease Education Program (NKDEP) does



   not endorse the use of the MDRD equation for patients that



   are not between the ages of 18 and 70, are pregnant, have



   extremes of body size, muscle mass, or nutritional status,



   or are non- or non-.



   According to the National Kidney Foundation, irrespective of



   diagnosis, the stage of the disease is based on the level of



   kidney function:



   Stage Description                      GFR(mL/min/1.73 m(2))



   1     Kidney damage with normal or decreased GFR       90



   2     Kidney damage with mild decrease in GFR          60-89



   3     Moderate decrease in GFR                         30-59



   4     Severe decrease in GFR                           15-29



   5     Kidney failure                       <15 (or dialysis)

 

 26  *******Because ethnic data is not always readily available,



   this report includes an eGFR for both -Americans and



   non- Americans.****



   The National Kid[employer] Education Program (NKDEP) does



   not endorse the use of the MDRD equation for patients that



   are not between the ages of 18 and 70, are pregnant, have



   extremes of body size, muscle mass, or nutritional status,



   or are non- or non-.



   According to the [employer], irrespective of



   diagnosis, the stage of the disease is based on the level of



   kidney function:



   Stage Description                      GFR(mL/min/1.73 m(2))



   1     Kidney damage with normal or decreased GFR       90



   2     Kidney damage with mild decrease in GFR          60-89



   3     Moderate decrease in GFR                         30-59



   4     Severe decrease in GFR                           15-29



   5     Kidney failure                       <15 (or dialysis)

 

 27  Please check labs this week

 

 28  Acute inflammation:  >10.00

 

 29  Please check fasting

 

 30  Acute inflammation:  >10.00

 

 31  -------------------REFERENCE VALUE--------------------------



   <1.0 (Negative)

 

 32  -------------------REFERENCE VALUE--------------------------



   <1.0 (Negative)



   Test Performed by:



   [address] [id number]

 

 33  Therapeutic target for the treatment of diabetes



   [place] patients is <7% HBA1C, and in selective



   patients <6.0%.Please refer to American Diabetes



   Association Diabetic care guidelines for further



   information.

 

 34  Acute inflammation:  >10.00

 

 35  *******Because ethnic data is not always readily available,



   this report includes an eGFR for both -Americans and



   non- Americans.****



   The National Kidney Disease Education Program (NKDEP) does



   not endorse the use of the MDRD equation for patients that



   are not between the ages of 18 and 70, are pregnant, have



   extremes of body size, muscle mass, or nutritional status,



   or are non- or non-.



   According to the National Kidney Foundation, irrespective of



   diagnosis, the stage of the disease is based on the level of



   kidney function:



   Stage Description                      GFR(mL/min/1.73 m(2))



   1     Kidney damage with normal or decreased GFR       90



   2     Kidney damage with mild decrease in GFR          60-89



   3     Moderate decrease in GFR                         30-59



   4     Severe decrease in GFR                           15-29



   5     Kidney failure                       <15 (or dialysis)

 

 36  Test Performed by:



   [place] [address]



   : Gus Roach II, M.D., Ph.D.

 

 37  -------------------REFERENCE VALUE--------------------------



   <20.0 (Negative)



   Test Performed by:



   [place] [address]



   : Gus Roach II, M.D., Ph.D.

 

 38  Please check in 1 month

 

 39  Please check labs this week

 

 40  Acute inflammation:  >10.00

 

 41  *******Because ethnic data is not always readily available,



   this report includes an eGFR for both -Americans and



   non- Americans.****



   The National Kidney Disease Education Program (NKDEP) does



   not endorse the use of the MDRD equation for patients that



   are not between the ages of 18 and 70, are pregnant, have



   extremes of body size, muscle mass, or nutritional status,



   or are non- or non-.



   According to the National Kidney Foundation, irrespective of



   diagnosis, the stage of the disease is based on the level of



   kidney function:



   Stage Description                      GFR(mL/min/1.73 m(2))



   1     Kidney damage with normal or decreased GFR       90



   2     Kidney damage with mild decrease in GFR          60-89



   3     Moderate decrease in GFR                         30-59



   4     Severe decrease in GFR                           15-29



   5     Kidney failure                       <15 (or dialysis)

 

 42  Please check labs this week

 

 43  Normal Range 180 to 914



   Indeterminate Range 145 to 180



   Deficient Range  <145

 

 44  Serologic response to B. burgdorferi infection is not



   detected, but cannot rule out early infection during



   which low or undetectable antibody levels to



   B. burgdorferi may be present. If clinically indicated,



   a new serum specimen should be submitted in 7-14 days.



   Test Performed by:



   [place] [id number]



   : Gus Roach II, M.D., Ph.D.

 

 45  please check this week

 

 46  Test Performed by:



   [place] [address]



   : Gus Roach II, M.D., Ph.D.

 

 47  AM 8.7-22.4



   PM <10

 

 48  -------------------REFERENCE VALUE--------------------------



   Not Applicable

 

 49  RESULT: HLA-B27 antigen was not detected.



   -------------------ADDITIONAL INFORMATION-------------------



   Method: Flow Cytometry



   Performing Laboratory CLIA# [id number]



   Test Performed by:



   [place] [address]



   : Gus Roach II, M.D., Ph.D.

 

 50  A negative result does not exclude infection with



   Borrelia burgdorferi. Serologic testing as per



   CDC guidelines may be indicated.



   -------------------ADDITIONAL INFORMATION-------------------



   Laboratory developed test.



   Test Performed by:



   [place] [address]



   : Gus Roach II, M.D., Ph.D.

 

 51  Cancelled due to duplicate test on this order



   Test Performed by:



   [place] [address]



   : Gus Roach II, M.D., Ph.D.

 

 52  please check this week

 

 53  -------------------REFERENCE VALUE--------------------------



   <4.0 (Negative)



   Test Performed by:



   [place] [address]



   : Gus Roach II, M.D., Ph.D.

 

 54  Negative serology. Celiac disease unlikely. However,



   approximately 10% of patients with celiac disease are



   seronegative. Also, patients who are already adhering to a



   gluten-free diet may be seronegative. If celiac disease is



   highly clinically suspected, consider HLA-DQ typing.



   Test Performed by:



   [place] [address]



   : Gus Roach II, M.D., Ph.D.

 

 55  -------------------REFERENCE VALUE--------------------------



   <=1.0 (Negative)

 

 56  -------------------REFERENCE VALUE--------------------------



   <20.0 (Negative)

 

 57  Tests for antibodies to dsDNA and SÁNCHEZ antigens are not



   performed automatically unless the JANEY result is > or=



   3.0 U.  Studies performed at [facility] indicate that



   positive JANEY results <3.0 U are rarely accompanied by



   positive second order tests.



   Test Performed by:



   [address] [address]



   : Gus Roach II, M.D., Ph.D.

 

 58  please check this week

 

 59  Normal Range 180 to 914



   Indeterminate Range 145 to 180



   Deficient Range  <145

 

 60  RESULT: 05:01,05



   -------------------REFERENCE VALUE--------------------------



   Not Applicable

 

 61  RESULT: 02:01,03:01



   DQ Serologic Equivalent:



   2,7



   -------------------REFERENCE VALUE--------------------------



   Not Applicable

 

 62  These genes are permissive for celiac disease.  The absence



   of HLA celiac permissive genes would make the presence of



   celiac disease unlikely.  However, these genes can also be



   present in the normal population.



   -------------------ADDITIONAL INFORMATION-------------------



   Method: Molecular typing of HLA antigens performed using



   reverse SSOP and/or SSP methods, reported as serological



   equivalents and low to medium resolution molecular values.



   Performing Laboratory CLIA# [id number]



   Test Performed by:



   [place] [address]



   : Gus Roach II, M.D., Ph.D.

 

 63  *******Because ethnic data is not always readily available,



   this report includes an eGFR for both -Americans and



   non- Americans.****



   The National Kidney Disease Education Program (NKDEP) does



   not endorse the use of the MDRD equation for patients that



   are not between the ages of 18 and 70, are pregnant, have



   extremes of body size, muscle mass, or nutritional status,



   or are non- or non-.



   According to the National Kidney Foundation, irrespective of



   diagnosis, the stage of the disease is based on the level of



   kidney function:



   Stage Description                      GFR(mL/min/1.73 m(2))



   1     Kidney damage with normal or decreased GFR       90



   2     Kidney damage with mild decrease in GFR          60-89



   3     Moderate decrease in GFR                         30-59



   4     Severe decrease in GFR                           15-29



   5     Kidney failure                       <15 (or dialysis)

 

 64  please check this week

 

 65  Test Performed by:



   [place] [address]



   : Gus Roach II, M.D., Ph.D.

 

 66  Test Performed by:



   [place] [address]



   : Gus Roach II, M.D., Ph.D.

 

 67  please check this week

 

 68  please check this week







Procedures







 Date  Code  Description  Status

 

 2018  38664  Trigger PT Inj(S) Single Or Multiple Points 1 Or 2 Muscles  
Completed

 

 2016  44734  Polysomnography Sleep Staging 4+ Parameters  Completed

 

 2016  13710  Polysomnography Sleep Staging 4+ Parameters  Completed

 

 2016  69276  ECHO Transthoracic, Real-Time 2D With Doppler And Color  
Completed



     Flow  

 

 2011  24476  Visual funct screen test, automated  Completed

 

 2011  60892  Pure Tone-Air Condition Only  Completed







Encounters







 Type  Date  Location  Provider  Dx  Diagnosis

 

 Office Visit  2019  Rheumatology  DORIAN Wasserman0.50  Arthropathic



   3:20p  Services Of Emmanuel HERNANDEZ    psoriasis,



           unspecified









 M79.7  Fibromyalgia

 

 Z79.899  Other long term (current) drug therapy

 

 M50.123  Cervical disc disorder at C6-C7 level with radiculopathy









 Office  2018  Neurosurgery  Vassilios  M50.122  Cervical disc



 Visit  11:00a  [facility] Emmanuel Salvador MD    disorder at C5-C6



           level with



           radiculopathy









 M50.123  Cervical disc disorder at C6-C7 level with radiculopathy









 Office Visit  2018  Rheumatology  Mykel  L40.50  Arthropathic



   3:40p  Services [facility] Emmanuel Crowe M.D.    psoriasis,



           unspecified









 M79.7  Fibromyalgia

 

 R53.83  Other fatigue

 

 M54.6  Pain in thoracic spine









 Office Visit  08/15/2018  Rheumatology  Mykel  L40.50  Arthropathic



   3:00p  [facility] Crowe M.D.    psoriasis,



           unspecified









 M79.7  Fibromyalgia

 

 E87.5  Hyperkalemia

 

 R53.83  Other fatigue

 

 Z79.899  Other long term (current) drug therapy

 

 Z23  Encounter for immunization









 Office Visit  2018  Rheumatology  Mykel ALARCON0.50  Arthropathic



   11:20a  [facility] Crowe M.D.    psoriasis,



           unspecified









 M79.7  Fibromyalgia

 

 R53.83  Other fatigue

 

 Z79.899  Other long term (current) drug therapy

 

 R51  Headache

 

 F17.210  Nicotine dependence, cigarettes, uncomplicated









 Office Visit  2018  Rheumatology  Mykel ALARCON0.50  Arthropathic



   9:00a  [facility] Emmanuel Crowe M.D.    psoriasis,



           unspecified









 M79.7  Fibromyalgia

 

 R53.83  Other fatigue

 

 Z79.899  Other long term (current) drug therapy

 

 R51  Headache









 Office Visit  2017  Rheumatology  Mykel  L40.50  Arthropathic



   1:00p  [facility] Crowe M.D.    psoriasis,



           unspecified









 M79.7  Fibromyalgia

 

 R53.83  Other fatigue

 

 M25.569  Pain in unspecified knee









 Office Visit  10/23/2017  Rheumatology  Mykel  L40.50  Arthropathic



   3:00p  [facility] Crowe M.D.    psoriasis,



           unspecified









 M79.7  Fibromyalgia

 

 M15.9  Polyosteoarthritis, unspecified

 

 Z79.899  Other long term (current) drug therapy

 

 Z23  Encounter for immunization









 Office Visit  2017  Rheumatology  Mykel  L40.50  Arthropathic



   9:00a  [facility] Crowe M.D.    psoriasis,



           unspecified









 M79.7  Fibromyalgia

 

 M15.9  Polyosteoarthritis, unspecified

 

 Z79.899  Other long term (current) drug therapy









 Office Visit  2017  Rheumatology  Mykel  L40.50  Arthropathic



   11:40a  [facility] Emmanuel Crowe M.D.    psoriasis,



           unspecified









 M79.1  Myalgia

 

 M54.5  Low back pain

 

 Z79.899  Other long term (current) drug therapy

 

 G47.9  Sleep disorder, unspecified

 

 M35.01  Sicca syndrome with keratoconjunctivitis

 

 F51.11  Primary hypersomnia

 

 R73.9  Hyperglycemia, unspecified









 Office Visit  2017  Rheumatology  Mykel  L40.50  Arthropathic



   2:40p  [facility] Emmanuel Crowe M.D.    psoriasis,



           unspecified









 M79.1  Myalgia

 

 M54.5  Low back pain

 

 Z79.899  Other long term (current) drug therapy









 Office Visit  10/18/2016  1:15p  Pulmonology And  Pat  G47.9  Sleep 
disorder,



     Sleep [facility] Of  RENE Buckley, RN,    unspecified



     [facility]  FNP-BC    









 G47.00  Insomnia, unspecified

 

 G47.14  Hypersomnia due to medical condition









 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



   2:00p  [facility] Emmanuel Crowe M.D.    polyarthropathy









 Z79.899  Other long term (current) drug therapy

 

 M54.5  Low back pain

 

 R20.8  Other disturbances of skin sensation

 

 M79.7  Fibromyalgia









 Office Visit  2016  1:40p  Rheumatology Services  PASTOR Wasserman.1  
Myalgia



     Of Cma  M.D.    









 M06.4  Inflammatory polyarthropathy

 

 Z79.899  Other long term (current) drug therapy

 

 M54.5  Low back pain









 Office Visit  2016  1:20p  Rheumatology Services  PASTOR Wasserman.1  
Myalgia



     Of Emmanuel HERNANDEZ    









 M06.4  Inflammatory polyarthropathy

 

 M54.5  Low back pain

 

 Z79.899  Other long term (current) drug therapy

 

 R74.8  Abnormal levels of other serum enzymes

 

 R53.1  Weakness









 Office Visit  2016  2:00p  Rheumatology Services  ELISEO Wasserman79.1  
Myalgia



     Of Emmanuel JORDAND.    









 M06.4  Inflammatory polyarthropathy

 

 Z79.899  Other long term (current) drug therapy

 

 M54.5  Low back pain

 

 M54.6  Pain in thoracic spine

 

 R20.8  Other disturbances of skin sensation

 

 L30.9  Dermatitis, unspecified

 

 F17.210  Nicotine dependence, cigarettes, uncomplicated









 Office Visit  2016 11:30a  Pulmonology And  Nicole  G47.9  Sleep 
disorder,



     Sleep Services Of  MD Zaira    unspecified



     [facility]      







Plan of Treatment

Future Appointment(s):2019  1:20 pm - Mykel Crowe M.D. at Rheumatology 
Services Of [facility]02/15/2019  9:30 am - Enrique Salvador MD at Neurosurgery 
Services Of [facility]2019 10:30 am - Myrtle Uribe PA-C at Neurosurgery Services 
Of [facility]2019 10:30 am - Enrique Salvador MD at Neurosurgery Services 
Of [facility]2019 - Enrique Salvador, MDM50.122 Cervical disc disorder at C5-
C6 level with radiculopathyFollow up:RV one week, one month, three months 
postop.M50.123 Cervical disc disorder at C6-C7 level with aheetzjvdteaaC61.50 
Arthropathic psoriasis, unspecified
Continuity of Care Document (CCD)

 Created on:2019



Patient:Diane Gonzalez

Sex:Female

:1975

External Reference #:[id number]





Demographics







 Address  [address] [place] [address]

 

 Home Phone  [phone]

 

 Mobile Phone  [phone]

 

 Email Address  [email or web address]

 

 Preferred Language  en

 

 Marital Status  Not  or 

 

 Zoroastrianism Affiliation  Unknown

 

 Race  White

 

 Ethnic Group  Not  or 









Author







 Name  Ceci Montano M.D.

 

 Address  [address]



   Unavailable



   [place] [address]









Support







 Name  Relationship  Address  Phone

 

 Pa Peña  Significant Other  Unavailable  [phone]









Care Team Providers







 Name  Role  Phone

 

 Ceci Montano M.D.  Care Team Information   Unavailable

 

 Ceci Montano M.D.  Primary Care Physician  Unavailable









Payers







 Type  Date  Identification Numbers  Payment Provider  Subscriber

 

   Effective: 2018  Policy Number: [id number]  [place] [place]









 PayID: [id number]  [address]









 [place] [address]









   Effective: 2016  Policy Number: [id number]  [facility]









 PayID: [id number]  [address] [address]









 [place] [address]









   Effective: 2018  Policy Number: [id number]  [place] 
[facility]









 Expires: 2019  PayID: [id number]  [address]









 [place] [address]







Advance Directives







 Description

 

 No Information Available







Problems







 Date  Description  Provider  Status

 

 Onset: 2015  Constipation  Jayne Lockhart M.D.  Active

 

 Onset: 2015  Tobacco user  Jayne Lockhart M.D.  Active

 

 Onset: 10/19/2015  Moderate recurrent major depression  Ceci Montano M.D.  
Active

 

 Onset: 10/19/2015  Chronic pain syndrome  Ceci Montano M.D.  Active

 

 Onset: 10/19/2015  Fibromyalgia  Ceci Montano M.D.  Active

 

 Onset: 10/19/2015  Disorder of connective tissue  Ceci Montano M.D.  Active

 

 Onset: 2016  Malaise and fatigue  Daquan Perez  Active

 

 Onset: 2018  Psoriatic arthritis  Ceci Montano M.D.  Active

 

 Onset: 2018  Brachial neuritis  Ceci Montano M.D.  Active







Family History







 Date  Family Member(s)  Problem(s)  Comments

 

   Father  Colon Cancer  

 

   Father  Diabetes Mellitus, II  

 

   Father  Chronic Obstructive Pulmonary Disease  



     (COPD)  

 

   Mother  Unremarkable  

 

   Children  2  

 

   First Son  Unremarkable  

 

   First Daughter  ADHD  

 

   Siblings  3  

 

   First Brother   due to Auto Accident  ()

 

   Second Brother  Unremarkable  

 

   Third Brother  Unremarkable  

 

   Paternal Grandfather   due to Unknown Causes  ()

 

   Paternal Grandmother   due to Unknown Causes  ()

 

   Maternal Grandfather   due to Diabetes  () - 92 yo

 

   Maternal Grandmother   due to Alzheimer's Disease  () - 70's







Social History







 Type  Date  Description  Comments

 

 Birth Sex    Unknown  

 

 Marital Status    Legal Status: Never  



       

 

 Marital Status    Legal Status: Domestic  male



     Partner  

 

 Occupation     -  



     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  

 

 Tobacco Use  Start: Unknown  Current Cigarette  



     Smoker 1/2 Pack Daily  

 

 ETOH Use    Denies alcohol use  

 

 Recreational Drug Use    Denies Drug Use  

 

 Tobacco Use  Reviewed: 09/17/15  Patient is a current  about 15 a day.



     smoker, smokes every  



     day  

 

 Smoking Status  Reviewed: 18  Patient is a current  about 15 a day.



     smoker, smokes every  



     day  

 

 Exercise Type/Frequency    Exercises rarely  

 

 Seat Belt/Car Seat    Always uses seat belt  







Allergies, Adverse Reactions, Alerts







 Description

 

 No Known Drug Allergies







Medications







 Medication  Date  Status  Form  Strength  Qnty  SIG  Indications  Ordering



                 Provider

 

 Nicorette    Active  Gum  2mg  220unit  1 unit by    Ceci Burrell        s  mouth every    [place],



             2 hours prn    M.D.



             , don't    



             swallow    

 

 Chantix    Active  Tablets  1mg  56tabs  1 tab by  F17.210  Ceci Ibarra  019          mouth twice    [place],



 Month Cory            a day    M.D.



             (start    



             after    



             starter    



             pack    



             completes)    

 

 Clobetasol    Active  Cream  0.05%  45gm  apply to  L40.8  Monica



 Propionate  017          affected    Keely,



             areas on    FNP



             affcted    



             skin twice    



             a day    

 

 Gabapentin    Active  Capsules  400mg  180caps  Take Two  F33.1  Ceci



   015          Capsules By    [place],



             Mouth Three    M.D.



             Times A Day    









 M79.7









 Ciclopirox  2015  Active  Cream  0.77%  30units  apply to  B35.3  Monica



 Olamine            affected    Keely,



             area(s) two    FNP



             times a day    



             to feet as    



             needed    

 

 Oxycodone HCL    Active  Tab ER 12H  20mg    4-6 qd  G89.4  Unknown



 ER      Abuse-Det          

 

 Amitiza    Active  Capsules  24mcg    1 twice a day  K59.09  Unknown



             as needed    



             constipation    



             per Dr Calderon    

 

 Diclofenac    Active  Tablets DR  25mg    3 twice a day    Unknown



 Sodium                

 

 Sulfasalazine    Active  Tablets  500mg    2 by mouth    Unknown



             twice a day    

 

 Tizanidine HCL    Active  Capsules  4mg    1  po bid    Unknown

 

 Fentanyl    Active  Patches  75mcg/    apply new  G89.4  Unknown



       72HR  HR    patch every 3    



             days    









 M79.7









 Adderall    Active  Tablets  5mg    take 1 by    Unknown



             mouth as    



             needed for    



             afternoon and    



             evening    



             symptoms    

 

 Enbrel    Active  Soln Prefill  50mg/    once a week    Unknown



       Syringe  ml        

 

                 

 

 Cymbalta  2017 -  Hx  Caps DR Part  30mg    3 by mouth  F3  [facility]



   2018          every day  3.  Dusty,



               1  M.D.

 

 Medroxyprogesterone  2017 -  Hx  Suspension  150mg    1 ml Im q 3  Z0  
Monica



 Acetate  2018      /ml    months  1.  Keely,



               41  FNP



               9  

 

 Nicotine  10/05/2016 -  Hx  Patches 24HR  21mg/  28  Apply 1 Patch  Z7  [facility]



   2017      24HR  un  To Skin Daily,  1.  [place]



             Remove Prior  6  M.D.



           s  Patch    

 

 Imiquimod  10/05/2016 -  Hx  Cream  5%  24  Apply To  A6  [facility]



   2017        un  Affected  3.  [place]



             Area(S) In  0  M.D.



           s  Groin 3 Times    



             A Week AT    



             Bedtime    

 

 Physical Therapy  2016 -  Hx        evaluate and    Suha



   2016          treat low back  9.  SRIDHAR Peña



             pain  7  

 

 Aqua Therapy  2016 -  Hx        For treatment  M7  Suha



   10/05/2016          of  9.  SRIDHAR Peña



             fibromyalgia  7  

 

 Fentanyl  03/15/2016 -  Hx  Patches 72HR  37.5m    Dr Calderon  G8  [facility]



   10/05/2016      cg/HR      9.  Dusty,



               4  M.D.









 M79.7









 Methotrexate  2015 -  Hx  Tablets  2.5mg    4 tab by  M35.9  Ceci



   10/05/2016          mouth    [place],



             every week    M.D.

 

 Gabapentin  2015 -  Hx  Capsules  300mg  90ca  1 tab by  F33.1  Ceci



   2015        ps  mouth    [place],



             three    M.D.



             times a    



             day    

 

 Senna  2015 -  Hx  Tablets  8.6mg  60ta  2 tab  K59.09  [facility]



   10/05/2016        bs  every day    [place],



             as needed    M.D.

 

 Fentanyl  10/19/2015 -  Hx  Patches 72HR  25mcg/HR    Dr Calderon  G89.4  Ceci



   03/15/2016              DAVID Montano









 M79.7









 Docusate  10/19/2015 -  Hx  Capsules  100mg  60caps  1-2 by  K59.09  [facility]



 Sodium  10/05/2016          mouth twice    [place],



             a day as    M.D.



             needed hard    



             stools    

 

 Cymbalta  10/19/2015 -  Hx  Caps DR  60mg  30caps  Take Two  F33.1  [facility]



   2017    Part      Capsules By    Dusty,



             Mouth Every    M.D.



             Day    

 

 Augmentin  2015 -  Hx  Tablets  875-125m  20tabs  1 by mouth  382.9  
Jayne L.



   10/19/2015      g    twice a day    Chantelle,



             with yogurt    M.D.



             or kefir.    

 

 Miralax  2015 -  Hx  Powder  3350NF  1Bottle  17 gm  564.00  Jayne L.



   2016          powder in    Chantelle,



             fluid daily    M.D.



             prn    









 K59.09









 Off Work Note  2015 -  Hx        medical  382.9  Jayne L.



  2015  10/19/2015          excuse.    DAVID Lockhart

 

 Chantix  2015 -  Hx  Tablets  0.5mg  1pac  use as  F17.210  Suha



 Starting Month  12/22/2015      X 11 &  k  directed    SRIDHAR Peña



 Cory        1 mg X        



         42        

 

 Lexapro  2015 -  Hx  Tablets  20mg  30ta  Take One  F33.1  Ceci



   2017        bs  Tablet By    DAVID Montano



             Mouth Every    



             Day    

 

 Cymbalta   -  Hx  Caps DR  60mg  30ca  1 by mouth  729.1  Suha



   2015    Part    ps  every day    SRIDHAR Peña

 

 Plaquenil   -  Hx  Tablets  200mg    take one  M35.9  Unknown



   2017          tablet by    



             mouth twice    



             a day    

 

 Lexapro   -  Hx  Tablets  10mg    1 by mouth    Suha



   2015          every day    SRIDHAR Peña

 

 Meloxicam   -  Hx  Tablets  7.5mg  60ta  1 by mouth  M79.7  Ceci



   10/05/2016        bs  twice a day    DAVID Montano

 

 Fentanyl   -  Hx  Patches  50mcg/      G89.4  Unknown



   2017    72HR  HR        









 M79.7







Immunizations







 CPT Code  Status  Date  Vaccine  Lot #

 

 [id number]  Given  2018  Tdap Tetanus, W Pertussis  [id number]

 

 53798  Given  10/05/2016  Influenza Vac, Quadrivalent, Slit Virus, Im  [id number]

 

 11811  Given  2015  Influenza Vac, Quadrivalent, Slit Virus, Im  [id number]







Vital Signs







 Date  Vital  Result  Comment

 

 2019  3:56pm  BP Systolic  102 mmHg  









 BP Diastolic  68 mmHg  

 

 Heart Rate  72 /min  

 

 Body Temperature  98.2 F  

 

 Respiratory Rate  16 /min  

 

 Height  66.5 inches  5'6.50"

 

 Weight  149.00 lb  

 

 BMI (Body Mass Index)  23.7 kg/m2  









 2018  1:52pm  BP Systolic  112 mmHg  









 BP Diastolic  76 mmHg  

 

 Heart Rate  100 /min  

 

 Body Temperature  98.8 F  

 

 Respiratory Rate  16 /min  

 

 Height  66.5 inches  5'6.50"

 

 Weight  144.00 lb  

 

 BMI (Body Mass Index)  22.9 kg/m2  









 2017  1:50pm  BP Systolic  120 mmHg  









 BP Diastolic  68 mmHg  

 

 Heart Rate  84 /min  

 

 Body Temperature  97.5 F  

 

 Height  66.5 inches  5'6.50"

 

 Weight  158.25 lb  

 

 BMI (Body Mass Index)  25.2 kg/m2  









 10/05/2016  3:56pm  BP Systolic  126 mmHg  









 BP Diastolic  70 mmHg  

 

 Heart Rate  96 /min  

 

 Body Temperature  97.7 F  

 

 Respiratory Rate  16 /min  

 

 Height  66.5 inches  5'6.50"

 

 Weight  172.12 lb  

 

 BMI (Body Mass Index)  27.4 kg/m2  









 2016 10:53am  BP Systolic  150 mmHg  









 BP Diastolic  80 mmHg  

 

 Heart Rate  96 /min  

 

 Body Temperature  98.9 F  

 

 Height  66.5 inches  5'6.50"

 

 Weight  170.25 lb  

 

 BMI (Body Mass Index)  27.1 kg/m2  









 2016 10:07am  BP Systolic  112 mmHg  









 BP Diastolic  60 mmHg  

 

 Heart Rate  80 /min  

 

 Body Temperature  97.9 F  

 

 Respiratory Rate  16 /min  

 

 Height  66.5 inches  5'6.50"

 

 Weight  173.00 lb  

 

 BMI (Body Mass Index)  27.5 kg/m2  









 2016 10:51am  BP Systolic  92 mmHg  









 BP Diastolic  60 mmHg  

 

 Heart Rate  88 /min  

 

 Body Temperature  98.6 F  

 

 Respiratory Rate  18 /min  

 

 Height  66.5 inches  5'6.50"

 

 Weight  171.50 lb  

 

 BMI (Body Mass Index)  27.3 kg/m2  









 2016 10:33am  BP Systolic  106 mmHg  









 BP Diastolic  60 mmHg  

 

 Heart Rate  102 /min  

 

 Body Temperature  97.0 F  

 

 Respiratory Rate  16 /min  

 

 Height  66.5 inches  5'6.50"

 

 Weight  171.50 lb  

 

 BMI (Body Mass Index)  27.3 kg/m2  









 2016  9:46am  BP Systolic  112 mmHg  









 BP Diastolic  60 mmHg  

 

 Heart Rate  72 /min  

 

 Body Temperature  97.2 F  

 

 Respiratory Rate  16 /min  

 

 Height  66.5 inches  5'6.50"

 

 Weight  171.00 lb  

 

 BMI (Body Mass Index)  27.2 kg/m2  









 02/15/2016  9:33am  BP Systolic  110 mmHg  









 BP Diastolic  70 mmHg  

 

 Heart Rate  80 /min  

 

 Respiratory Rate  16 /min  

 

 Height  66.5 inches  5'6.50"

 

 Weight  174.00 lb  

 

 BMI (Body Mass Index)  27.7 kg/m2  









 01/15/2016 10:46am  BP Systolic  110 mmHg  









 BP Diastolic  64 mmHg  

 

 Heart Rate  72 /min  

 

 Body Temperature  98.2 F  

 

 Respiratory Rate  16 /min  

 

 Height  66.5 inches  5'6.50"

 

 Weight  174.00 lb  

 

 BMI (Body Mass Index)  27.7 kg/m2  









 2015  4:10pm  BP Systolic  110 mmHg  









 BP Diastolic  64 mmHg  

 

 Heart Rate  88 /min  

 

 Body Temperature  98.3 F  

 

 Respiratory Rate  16 /min  

 

 Height  66.5 inches  5'6.50"

 

 Weight  179.00 lb  

 

 BMI (Body Mass Index)  28.5 kg/m2  









 2015  9:04am  BP Systolic  122 mmHg  









 BP Diastolic  78 mmHg  

 

 Heart Rate  68 /min  

 

 Body Temperature  97.4 F  

 

 Respiratory Rate  16 /min  

 

 Height  66.5 inches  5'6.50"

 

 Weight  183.00 lb  

 

 BMI (Body Mass Index)  29.1 kg/m2  









 10/19/2015 10:06am  BP Systolic  114 mmHg  









 BP Diastolic  64 mmHg  

 

 Heart Rate  76 /min  

 

 Respiratory Rate  16 /min  

 

 Height  66.5 inches  5'6.50"

 

 Weight  187.00 lb  

 

 BMI (Body Mass Index)  29.7 kg/m2  









 2015 11:38am  BP Systolic  122 mmHg  









 BP Diastolic  80 mmHg  

 

 Heart Rate  62 /min  

 

 Body Temperature  98.6 F  

 

 Respiratory Rate  18 /min  

 

 Height  66.5 inches  5'6.50"

 

 Weight  182.00 lb  

 

 BMI (Body Mass Index)  28.9 kg/m2  









 2015  9:04am  BP Systolic  122 mmHg  









 BP Diastolic  76 mmHg  

 

 Heart Rate  68 /min  

 

 Body Temperature  97.5 F  

 

 Respiratory Rate  16 /min  

 

 Height  66.5 inches  5'6.50"

 

 Weight  189.00 lb  

 

 BMI (Body Mass Index)  30.0 kg/m2  







Results







 Test  Date  Facility  Test  Result  H/L  Range  Note

 

 Basic Metabolic [place]  [place]12[place]  [facility] [place]  Sodium  139 mmol/L  N  135-145  









 Potassium  4.3 mmol/L  N  3.5-5.0  

 

 Chloride  105 mmol/L  N  101-111  

 

 Co2 Carbon Dioxide  27 mmol/L  N  22-32  

 

 Anion Gap  7 mmol/L  N  2-11  

 

 Glucose  83 mg/dL  N    

 

 Blood Urea Nitrogen  10 mg/dL  N  6-24  

 

 Creatinine  0.69 mg/dL  N  0.51-0.95  

 

 BUN/Creatinine Ratio  14.5  N  8-20  

 

 Calcium  9.9 mg/dL  N  8.6-10.3  

 

 Egfr Non-  93.8    >60  

 

 Egfr   113.4    >60  1









 Laboratory test finding  10/12/2018  [facility]  C Reactive Protein  < 1.00 mg/L  N  <
8.01  2









 Cortisol  7.70 g/dL      3

 

 Thyroperoxidase AB  0.70 IU/mL  N  <9  4









 Lyme Western Blot  2018  [facility] [place]  Lyme Disease IgG Ab WB  Negative    
Negative  









 Lyme Disease IgG Bands Present  No bands detecte <SEE NOTE> kDa      5

 

 Lyme Disease IgM Ab WB  Negative    Negative  

 

 Lyme Disease IgM Bands Present  No bands detecte <SEE NOTE> kDa      6

 

 Lyme Disease Interpretation  See Comment      7









 Comp Metabolic Panel  2018  [facility]  Sodium  140 mmol/L  N  135-145  









 Chloride  103 mmol/L  N  101-111  

 

 Co2 Carbon Dioxide  30 mmol/L  N  22-32  

 

 Glucose  65 mg/dL  Low    

 

 Blood Urea Nitrogen  7 mg/dL  N  6-24  

 

 Creatinine  0.69 mg/dL  N  0.51-0.95  

 

 BUN/Creatinine Ratio  10.1  N  8-20  

 

 Calcium  9.7 mg/dL  N  8.6-10.3  

 

 Total Protein  7.0 g/dL  N  6.4-8.9  

 

 Albumin  4.4 g/dL  N  3.2-5.2  

 

 Globulin  2.6 g/dL  N  2-4  

 

 Albumin/Globulin Ratio  1.7  N  1-3  

 

 Total Bilirubin  0.30 mg/dL  N  0.2-1.0  

 

 Alkaline Phosphatase  42 U/L  N    

 

 Alt  9 U/L  N  7-52  

 

 Ast  14 U/L  N  13-39  

 

 Egfr Non-  93.8    >60  

 

 Egfr   113.4    >60  8

 

 Potassium  5.2 mmol/L  High  3.5-5.0  

 

 Anion Gap  7 mmol/L  N  2-11  









 Laboratory test finding  2018  [facility]  C Reactive Protein  1.65 mg/L  N  <
8.01  9









 TSH (Thyroid Stim Horm)  1.83 mcIU/mL  N  0.34-5.60  10

 

 Folic Acid (Folate)  8.22 ng/mL    >3.99  11

 

 Vitamin B12  344 pg/mL  N  180-914  12









 CBC Auto Diff  2018  [facility] [place]  White Blood Count  8.3 10^3/uL  N  3.5-10.8  









 Red Blood Count  4.29 10^6/uL  N  4.00-5.40  

 

 Hemoglobin  14.5 g/dL  N  12.0-16.0  

 

 Hematocrit  42 %  N  35-47  

 

 Mean Corpuscular Volume  97 fL  N  80-97  

 

 Mean Corpuscular Hemoglobin  34 pg  High  27-31  

 

 Mean Corpuscular HGB Conc  35 g/dL  N  31-36  

 

 Red Cell Distribution Width  13 %  N  10.5-15  

 

 Platelet Count  308 10^3/uL  N  150-450  

 

 Mean Platelet Volume  7.8 um3  N  7.4-10.4  

 

 Abs Neutrophils  3.7 10^3/uL  N  1.5-7.7  

 

 Abs Lymphocytes  3.5 10^3/uL  N  1.0-4.8  

 

 Abs Monocytes  0.8 10^3/uL  N  0-0.8  

 

 Abs Eosinophils  0.3 10^3/uL  N  0-0.6  

 

 Abs Basophils  0.1 10^3/uL  N  0-0.2  

 

 Abs Nucleated RBC  0 10^3/uL      

 

 Granulocyte %  44.7 %  N  38-83  

 

 Lymphocyte %  42.2 %  N  25-47  

 

 Monocyte %  9.0 %  High  0-7  

 

 Eosinophil %  3.1 %  N  0-6  

 

 Basophil %  1.0 %  N  0-2  

 

 Nucleated Red Blood Cells %  0.1      









 Laboratory test finding  2018  [facility]  Erythrocyte Sed Rate  13 mm/Hr  N  0-
14  13









 Vitamin D, 1,25 Dihydroxy  19 pg/mL    18-78  14









 Laboratory test  2018  [facility]  Erythrocyte Sed Rate  20 mm/Hr  High  0-14  



 finding              

 

 CBC Auto Diff  2018  [facility] [place]  White Blood Count  8.0 10^3/uL  N  3.5-10.8  









 Red Blood Count  4.25 10^6/uL  N  4.0-5.4  

 

 Hemoglobin  14.2 g/dL  N  12.0-16.0  

 

 Hematocrit  41 %  N  35-47  

 

 Mean Corpuscular Volume  97 fL  N  80-97  

 

 Mean Corpuscular Hemoglobin  33 pg  High  27-31  

 

 Mean Corpuscular HGB Conc  34 g/dL  N  31-36  

 

 Red Cell Distribution Width  13 %  N  10.5-15  

 

 Platelet Count  281 10^3/uL  N  150-450  

 

 Mean Platelet Volume  8 um3  N  7.4-10.4  

 

 Abs Neutrophils  4.2 10^3/uL  N  1.5-7.7  

 

 Abs Lymphocytes  2.9 10^3/uL  N  1.0-4.8  

 

 Abs Monocytes  0.5 10^3/uL  N  0-0.8  

 

 Abs Eosinophils  0.3 10^3/uL  N  0-0.6  

 

 Abs Basophils  0.1 10^3/uL  N  0-0.2  

 

 Abs Nucleated RBC  0 10^3/uL      

 

 Granulocyte %  52.2 %  N  38-83  

 

 Lymphocyte %  37.0 %  N  25-47  

 

 Monocyte %  6.7 %  N  1-9  

 

 Eosinophil %  3.2 %  N  0-6  

 

 Basophil %  0.9 %  N  0-2  

 

 Nucleated Red Blood Cells %  0.1      









 Laboratory test finding  2018  [facility]  C Reactive Protein  < 1.00 mg/L  N  
< 5.00  15

 

 Comp Metabolic Panel  2018  [facility] [place]  Sodium  139 mmol/L  N  133-145  









 Potassium  4.5 mmol/L  N  3.5-5.0  

 

 Chloride  104 mmol/L  N  101-111  

 

 Co2 Carbon Dioxide  31 mmol/L  N  22-32  

 

 Anion Gap  4 mmol/L  N  2-11  

 

 Glucose  69 mg/dL  Low    

 

 Blood Urea Nitrogen  9 mg/dL  N  6-24  

 

 Creatinine  0.75 mg/dL  N  0.51-0.95  

 

 BUN/Creatinine Ratio  12.0  N  8-20  

 

 Calcium  9.7 mg/dL  N  8.6-10.3  

 

 Total Protein  7.0 g/dL  N  6.4-8.9  

 

 Albumin  4.4 g/dL  N  3.2-5.2  

 

 Globulin  2.6 g/dL  N  2-4  

 

 Albumin/Globulin Ratio  1.7  N  1-3  

 

 Total Bilirubin  0.50 mg/dL  N  0.2-1.0  

 

 Alkaline Phosphatase  44 U/L  N    

 

 Alt  8 U/L  N  7-52  

 

 Ast  13 U/L  N  13-39  

 

 Egfr Non-  85.6    >60  

 

 Egfr   110.1    >60  16









 Laboratory test  [place]  [facility] [place]  Cytology Thinprep  SEE RESULT      17



 finding      w/rfx([facility])  BELOW      

 

 Complete Blood  2016  [facility] ([facility])  WBC  7.7 x10^3/UL    3.6-9.6
  



 Count              









 RBC  4.56 x10^6/UL    3.90-5.70  

 

 HGB  15.1 g/dL    12.1-17.2  

 

 HCT  43 %    36-50  

 

 MCV  95.0 fL    82.2-97.4  

 

 MCH  33.1 pg    27.6-33.3  

 

 MCHC  35.0 g/dL    33.0-35.5  

 

 RDW  13.6 %    11.6-13.7  

 

 PLT  309 x10^3/UL    150-400  

 

 MPV  6.8 fL  Low  7.4-10.4  

 

 Gran #  5.6 x10^3/UL    1.5-7.2  

 

 Lymph#  1.9 x10^3/UL    0.7-4.9  

 

 Mono#  0.2 x10^3/UL    0.1-0.9  

 

 Gran %  71.0 %    42.2-75.2  

 

 Lymph %  25.1 %    20.5-51.1  

 

 Mono%  3.9 %    1.7-9.3  









 Comprehensive Metabolic  2016  [facility] ([facility]  Sodium  137 mEq/L  
  134-149  



 Prof              









 Potassium  4.0 mEq/L    3.6-5.5  

 

 Chloride  97 mEq/L      

 

 Carbon Dioxide  28 mEq/L    21-32  

 

 Glucose  115 mg/dL  High    18

 

 BUN  10 mg/dL    6-26  

 

 Creatinine  0.6 mg/dL    0.6-1.4  

 

 BUN/Creat Ratio  16.7 CALC    8.0-36.0  

 

 Calcium  9.8 mg/dL    8.6-10.2  

 

 Total Protein  7.2 g/dL    6.4-8.3  

 

 Albumin  4.4 g/dL    3.8-5.5  

 

 Globulin  2.8 g/dL    2.0-4.8  

 

 A/G Ratio  1.6 CALC    0.6-2.3  

 

 Alk. Phosphatase  54 U/L      

 

 Alt (SGPT)  36 U/L  High  7-35  

 

 Ast (Sgot)  25 U/L    5-34  

 

 Total Bilirubin  0.3 mg/dL    0.2-1.3  

 

 GFR Non-African American  >60 ml/min/1.73m^    >=60  

 

 GFR African American  >60 ml/min/1.73m^    >=60  









 Laboratory test  2016  [facility] ([facility])  Free T4  0.85 ng/dL    0.75-
1.54  



 finding              









 TSH  1.02 mIU/L    0.50-6.00  









 Lyme AB/Western  2016  [facility]  Lyme IgG/IgM  <0.91 ISR    0.00-0.90  19
, 20



 Blot Reflex    [address] [facility]  Ab        



     [place] [address]          



     (259)-   -          









 Lyme Disease Ab, Quant, IgM  <0.80 index    0.00-0.79  21









 1  *******Because ethnic data is not always readily available,



   this report includes an eGFR for both -Americans and



   non- Americans.****



   The National Kidney Disease Education Program (NKDEP) does



   not endorse the use of the MDRD equation for patients that



   are not between the ages of 18 and 70, are pregnant, have



   extremes of body size, muscle mass, or nutritional status,



   or are non- or non-.



   According to the National Kidney Foundation, irrespective of



   diagnosis, the stage of the disease is based on the level of



   kidney function:



   Stage Description                      GFR(mL/min/1.73 m(2))



   1     Kidney damage with normal or decreased GFR       90



   2     Kidney damage with mild decrease in GFR          60-89



   3     Moderate decrease in GFR                         30-59



   4     Severe decrease in GFR                           15-29



   5     Kidney failure                       <15 (or dialysis)

 

 2  Please check labs today

 

 3  AM 8.7-22.4



   PM <10

 

 4  Please check labs today

 

 5  No bands detected

 

 6  No bands detected

 

 7  Specific serologic response to B. burgdorferi infection is



   not detected, but cannot rule out early infection during



   which low or undetectable antibody levels to B. burgdorferi



   may be present.  If clinically indicated, a new serum



   specimen should be submitted in 7-14 days.



   -------------------ADDITIONAL INFORMATION-------------------



   Per CDC criteria, the Lyme IgG Immunoblot is interpreted as



   positive if IgG-class antibodies are detected to >=5 B.



   burgdorferi proteins, and the Lyme IgM Immunoblot is



   interpreted as positive if IgM-class antibodies are



   detected to >=2 B. burgdorferi proteins. Immunoblot



   patterns not meeting these criteria should not be



   interpreted as positive. Epitopes from certain B.



   burgdorferi proteins (e.g., p41) are conserved across other



   bacteria, which may lead to the detection of IgM- and/or



   IgG-class antibodies on the Lyme disease immunoblots in



   patients without Lyme disease. Immunoblot should only be



   ordered on specimens that are positive or equivocal by a



   FDA-licensed Lyme disease antibody screening test (e.g.,



   EIA). Results of the Lyme IgM immunoblot should not be



   considered in patients with >=30 days of symptoms.



   Test Performed by:



   [facility]



   [address] [place] [address]

 

 8  *******Because ethnic data is not always readily available,



   this report includes an eGFR for both -Americans and



   non- Americans.****



   The National Kidney Disease Education Program (NKDEP) does



   not endorse the use of the MDRD equation for patients that



   are not between the ages of 18 and 70, are pregnant, have



   extremes of body size, muscle mass, or nutritional status,



   or are non- or non-.



   According to the National Kidney Foundation, irrespective of



   diagnosis, the stage of the disease is based on the level of



   kidney function:



   Stage Description                      GFR(mL/min/1.73 m(2))



   1     Kidney damage with normal or decreased GFR       90



   2     Kidney damage with mild decrease in GFR          60-89



   3     Moderate decrease in GFR                         30-59



   4     Severe decrease in GFR                           15-29



   5     Kidney failure                       <15 (or dialysis)

 

 9  Please check this week

 

 10  Please check this week

 

 11  Please check this week

 

 12  Normal Range 180 to 914



   Indeterminate Range 145 to 180



   Deficient Range  <145

 

 13  Please check this week

 

 14  -------------------ADDITIONAL INFORMATION-------------------



   This test was developed and its performance characteristics



   determined by [facility] in a manner consistent with CLIA



   requirements. This test has not been cleared or approved by



   the U.S. Food and Drug Administration.



   Test Performed by:



   [facility]



   [address] [place] [address]

 

 15  Acute inflammation:  >10.00

 

 16  *******Because ethnic data is not always readily available,



   this report includes an eGFR for both -Americans and



   non- Americans.****



   The National Kidney Disease Education Program (NKDEP) does



   not endorse the use of the MDRD equation for patients that



   are not between the ages of 18 and 70, are pregnant, have



   extremes of body size, muscle mass, or nutritional status,



   or are non- or non-.



   According to the National Kidney Foundation, irrespective of



   diagnosis, the stage of the disease is based on the level of



   kidney function:



   Stage Description                      GFR(mL/min/1.73 m(2))



   1     Kidney damage with normal or decreased GFR       90



   2     Kidney damage with mild decrease in GFR          60-89



   3     Moderate decrease in GFR                         30-59



   4     Severe decrease in GFR                           15-29



   5     Kidney failure                       <15 (or dialysis)

 

 17  SEE RESULT BELOW



   -----------------------------------------------------------------------------
---------------



   Name:  DIANE GONZALEZ                   : 1975    Attend Dr: Monica Riggs NP



   Acct:  [id number]  Unit: [id number]  AGE: 42            Location:  [facility]



   Re17                        SEX: F             Status:    REG REF



   -----------------------------------------------------------------------------
---------------



   



   SPEC: [id number]            ALLAN: 17-      [facility] DR: Moncia Riggs NP



   REQ:  [id number]             RECD: 



   STATUS: SOUT



   _



   ORDERED:  TP IMAGE ANAL, HPV/Thin Prep, HPV 16/18 GENE



   COMMENTS: [id number]



   Negative for Intraepithelial lesion or Malignancy



   Shift in farhana suggestive of bacterial vaginosis



   A. Ectocervical/Endocervical



   Specimen Adequacy:



   Satisfactory of evaluation



   Transformation zone component identified



   Patient Information:



   HPV: High risk HPV RNA testing regardless of pap results.



   HPV 16/18 Genotype Reflex



   Actual Specimen Date:         17



   Last Menstrual Date:          17



   Spec Date if unknown:       



   Pregnant?:     N



   Post Menopausal?:             N



   Hysterectomy?:                N



   Previous Abnormal Pap Smears?:N



   



   -----------------------------------------------------------------------------
---------------



   Date     Time Test              Result   Flag (u) Normal Range



   17 4452 @ HPV RNA RFLX GE Negative          Negative



   @



   @               The high-risk HPV types detected by the assay include: 16,



   @               18, 31, 33, 35, 39, 45, 51, 52, 56, 58, 59, 66, and 68.



   -----------------------------------------------------------------------------
---------------



   



   



   Signed __________(signature on file)___________ DUNIA Gipson (ASCP)  1430



   



   This Pap test was evaluated with the assistance of the MediaLABPrep Test Imaging 
System. Due to



   cytologic findings at the imager microscope, comprehensive manual 
rescreening by a



   Cytotechnologist may be required.



   The Pap Smear is a screening test designed to aid in the detection of 
premalignant and



   malignant conditions of the uterine cervix.  It is not a diagnostic 
procedure and should



   not be used as the sole means of detecting cervical cancer.  Both false-
positive and false-



   negative reports do occur.  Depending on your risk status, a Pap smear 
should be obtained



   and evaluated every 1-3 years.



   



   -----------------------------------------------------------------------------
---------------



   



   ** END OF REPORT **



   



   * ML=Testing performed at Main Lab



   DEPARTMENT OF PATHOLOGY,  [address] [place] [address]



   Phone # [phone]      Fax #[phone]



   Bratxon Garcia M.D. Director     [facility] # [id number]

 

 18  NON-FASTING

 

 19  1 sst

 

 20  Negative         <0.91



   Equivocal  0.91 - 1.09



   Positive         >1.09

 

 21  Negative         <0.80



   Equivocal  0.80 - 1.19



   Positive         >1.19



   IgM levels may peak at 3-6 weeks post infection, then



   gradually decline.







Procedures







 Description

 

 No Information Available







Encounters







 Type  Date  Location  Provider  Dx  Diagnosis

 

 Office Visit  2018  [facility]  Ceci Montano  Z00.01  Encounter 
for



   1:40p    M.D.    general adult



           medical exam w



           abnormal findings









 Z12.31  Encntr screen mammogram for malignant neoplasm of breast

 

 G89.4  Chronic pain syndrome

 

 M79.7  Fibromyalgia

 

 L23.1  Allergic contact dermatitis due to adhesives

 

 Z23  Encounter for immunization

 

 M54.12  Radiculopathy, cervical region

 

 R63.4  Abnormal weight loss









 Office Visit  2017  2:00p  [facility] Office  NICOLETTE Casarez  Z01.419  
Encntr for gyn



           exam (general)



           (routine) w/o abn



           findings









 Z12.31  Encntr screen mammogram for malignant neoplasm of breast

 

 L40.8  Other psoriasis









 Office Visit  10/05/2016  4:00p  [facility]  Ceci Montano,  G89.4  
Chronic pain



       M.D.    syndrome









 M79.7  Fibromyalgia

 

 F33.1  Major depressive disorder, recurrent, moderate

 

 A63.0  Anogenital (venereal) warts

 

 Z71.6  Tobacco abuse counseling

 

 F17.210  Nicotine dependence, cigarettes, uncomplicated

 

 Z23  Encounter for immunization









 Office Visit  2016 11:00a  [facility]  Suha Peña NP  M79.7  
Fibromyalgia









 G89.4  Chronic pain syndrome

 

 F33.1  Major depressive disorder, recurrent, moderate

 

 F51.8  Oth sleep disord not due to a sub or known physiol cond









 Office Visit  2016 10:15a  [facility]  Suha Peña NP  M79.7  
Fibromyalgia









 G89.4  Chronic pain syndrome

 

 F33.1  Major depressive disorder, recurrent, moderate

 

 F51.8  Oth sleep disord not due to a sub or known physiol cond

 

 K59.09  Other constipation

 

 M35.9  Systemic involvement of connective tissue, unspecified









 Office Visit  2016 10:45a  [facility]  Orquidea Gabbi,  R53.83  
Other fatigue



       Afnp-C    









 M79.7  Fibromyalgia

 

 G89.4  Chronic pain syndrome

 

 F33.1  Major depressive disorder, recurrent, moderate









 Office Visit  2016 10:40a  [facility]  Ceci Montano,  G89.4  
Chronic pain



       M.D.    syndrome









 M35.9  Systemic involvement of connective tissue, unspecified

 

 M79.7  Fibromyalgia

 

 F51.8  Oth sleep disord not due to a sub or known physiol cond









 Office Visit  2016  9:30a  [facility]  Ceci Montano,  G89.4  
Chronic pain



       M.D.    syndrome









 M35.9  Systemic involvement of connective tissue, unspecified

 

 M79.7  Fibromyalgia

 

 F51.8  Oth sleep disord not due to a sub or known physiol cond









 Office Visit  02/15/2016  9:20a  [facility]  Ceci Montano,  G89.4  
Chronic pain



       M.D.    syndrome









 M35.9  Systemic involvement of connective tissue, unspecified

 

 M79.7  Fibromyalgia

 

 F33.1  Major depressive disorder, recurrent, moderate









 Office Visit  01/15/2016 10:30a  [facility]  Ceci Montano M.D.  M79.7  
Fibromyalgia









 G89.4  Chronic pain syndrome

 

 F33.1  Major depressive disorder, recurrent, moderate

 

 R01.1  Cardiac murmur, unspecified

 

 F51.8  Oth sleep disord not due to a sub or known physiol cond









 Office Visit  2015  4:30p  [facility]  Ceci Dusty,  F33.1  Major 
depressive



       M.D.    disorder,



           recurrent, moderate









 M79.7  Fibromyalgia

 

 G89.4  Chronic pain syndrome

 

 F17.210  Nicotine dependence, cigarettes, uncomplicated

 

 M35.9  Systemic involvement of connective tissue, unspecified









 Office Visit  2015  9:00a  [facility]  Ceci Dusty,  F33.1  Major 
depressive



       M.D.    disorder,



           recurrent,



           moderate









 M79.7  Fibromyalgia

 

 G89.4  Chronic pain syndrome

 

 F17.210  Nicotine dependence, cigarettes, uncomplicated

 

 K59.09  Other constipation









 Office Visit  10/19/2015 10:00a  [facility]  Ceci Montano M.D.  M79.7  
Fibromyalgia









 F33.1  Major depressive disorder, recurrent, moderate

 

 K59.09  Other constipation

 

 G89.4  Chronic pain syndrome

 

 L94.9  Localized connective tissue disorder, unspecified









 Office Visit  2015 11:30a  [facility]  Jayne WARNER  382.9  Otitis 
Media



       DAVID Lockhart    Unspec









 564.00  Constipation Unspecified

 

 305.1  Tobacco Use Disorder

 

 V04.81  Need For Prophylactic Vaccination & Inoculation/Influenza









 Office Visit  2015  9:00a  [facility]  Suha Peña,  311  
Depressive



       NP    Disorder Not



           Elsewhere Spec









 729.1  Myalgia & Myositis Unspec

 

 305.1  Tobacco Use Disorder

 

 110.4  Dermatophytosis Foot







Plan of Treatment

2019 - Ceci Montano M.D.Z01.818 Encounter for other preprocedural 
examinationComments:Cleared for surgery. Will fax note to ordering physician.  
HOLD NSAIDS and supplements 5-7 days prior to pasfkozI51.12 Radiculopathy, 
cervical regionComments:discuss regarding pain meds after surgery with Dr Salvador and GlidmQ02.4 Chronic pain syndromeComments:stable on uithrrbS64.7 
FibromyalgiaComments:follow with Dr Calderon/GnjyuyI50.8 Other psoriasisComments:
stop enbrel until cleared by surgeon to whtgndS07.210 Nicotine dependence, 
cigarettes, uncomplicatedNew Medication:Chantix Continuing Month Cory 1 mg - 1 
tab by mouth twice a day (start after starter pack completes)Comments:Patient 
was counselled on the importance of smoking cessation  great cutting back, try 
using gum in meantimeAllComments:~B_~U_Medication Management~b_~u_ Patient 
Understands medications she's taking?     Yes    No  Are there Barriers to 
Adherence?    Yes    No  Has the patient been asked about herbal supplements 
and therapies, and OTC meds?      Yes    No
Continuity of Care Document (CCD)

 Created on:2019



Patient:Diane Gonzalez

Sex:Female

:1975

External Reference #:[id number]





Demographics







 Address  [address] [place] [address] [address]



   [place] [address]

 

 Home Phone  [phone]

 

 Preferred Language  en

 

 Marital Status  Not  or 

 

 Yarsanism Affiliation  Unknown

 

 Race  White

 

 Ethnic Group  Not  or 









Author







 Name  Yair Calderon DO, MPH

 

 Address  [address] [facility] [address], [address]



   Unavailable



   [place] [address]









Care Team Providers







 Name  Role  Phone

 

 Rin Polanco N.P  Care Team Information   Unavailable

 

 Ceci Montano M.D.  Primary Care Physician  Unavailable









Payers







 Type  Date  Identification Numbers  Payment Provider  Subscriber

 

     Policy Number: [id number]  [employer]  Diane Gonzalez









 PayID: [id number]  [address]









 [place] [address]









   Effective: 2017  Policy Number: [id number]  [place]icaid [place]  Diane Gonzalez









 PayID: [id number]  [address] [place] [address]









   Effective: 2017  Policy Number:  [employer]  Diane Gonzalez



     [id number]    









 Expires: 2018  PayID: [id number]  [address] [address]









 [place] [address]









   Effective: 2014  Policy Number[facility] [id number]  [facility]  [facility]









 Expires: 2015  PayID: [id number]  [address] [address]









 [place] [address]









   Effective: 2016  Policy Number: [id number]  [place]  Diane Gonzalez









 Expires: 2016  PayID: [id number]  [address] [address]









 [place] [address]









   Effective: 2016  Policy Number: [id number]  [place]icaid [place]  Diane Gonzalez









 Expires: 2016  PayID: [id number]  [address]









 [place] [address]







Advance Directives







 Description

 

 No Information Available







Problems







 Description

 

 No Information







Family History







 Date  Family Member(s)  Problem(s)  Comments

 

   Father  71  

 

   Mother  68  

 

   Children  2  

 

   Siblings  3  

 

   Grandchildren  None  







Social History







 Type  Date  Description  Comments

 

 Birth Sex    Unknown  

 

 Marital Status    Significant Other  

 

 Occupation    Currently Working  

 

 Occupation      manager

 

 Work Status    Currently Working  

 

 Cigarette Use    Current Cigarette Smoker  1 Pack Daily  

 

 ETOH Use    Rarely consumes alcohol  

 

 Tobacco Use  Start: Unknown  Patient is a current smoker, smokes every  



     day  

 

 Smoking Status  Reviewed: 19  Patient is a current smoker, smokes every  



     day  







Allergies, Adverse Reactions, Alerts







 Date  Description  Reaction  Status  Severity  Comments

 

 12/15/2014  Oxymorphone    Active    itching

 

 2014  NKDA    Inactive    







Medications







 Medication  Date  Status  Form  Strength  Qnty  SIG  Indications  Ordering



                 Provider

 

 Fentanyl  08/10  Active  Patches  75mcg/HR  10uni  si by    Kvng    72HR    ts  mouth q72h    Yair,



             as    DO, MPH



             directed    



             chronic    



             pain    



             patient,    



             mylan    



             brand only    

 

 Zanaflex    Active  Capsules  4mg  60cap  si/2-1    Kvng        s  by mouth    Yair,



             three    DO, MPH



             times a    



             day as    



             directed    



             chronic    



             pain    



             patient    

 

 Amitiza    Active  Capsules  24mcg  60cap  si by    Kvng        s  mouth    Yair,



             twice a    DO, MPH



             day    

 

 Oxycodone HCL    Active  Tablets  20mg  180ta  si-2    Calderon        bs  by mouth    Yair,



             every 4 to    DO, MPH



             6 hours as    



             directed    



             chronic    



             pain    



             patient    

 

 Cymbalta    Active  Caps DR  30mg  30cap  1 by mouth    Unknown



   /    Part    s  every 8    



             hours as    



             directed    

 

 Flonase Allergy    Active  Suspension  50mcg/Act        Unknown



 Relief                

 

 Gabapentin    Active  Capsules  300mg    si by    Unknown



   /0000          mouth    



             three    



             times a    



             day as    



             directed    

 

 Trazodone HCL    Active  Tablets  50mg    si    Unknown



   0000          tablet by    



             mouth    



             every    



             night at    



             bedtime as    



             directed    

 

 Sulfasalazine    Active  Tablets  500mg    1 by mouth    Unknown



   /          three    



             times    



             daily    

 

 Diclofenac Sodium    Active  Tablets DR  75mg    one by    Unknown



   /0000          mouth    



             twice    



             daily    

 

 Amphetamine-Dextr    Active  Tablets  5mg    1 by mouth    Unknown



 oamphetamine  /0000          every day    



             as    



             directed    

 

 Enbrel    Active  Soln  50mg/ml    weekly    Unknown



       Prefill          



       Syringe          

 

 Hydroxychloroquin    Active  Tablets  200mg    1 by mouth    Unknown



 e Sulfate            daily    

 

                 

 

 Fentanyl    Hx  Patches  37.5mcg/H  10uni  si    Calderon    72HR  R  ts  patch    Yair,



   -          every 48    DO, MPH



             hours as    



   /2016          directed    



             chronic    



             pain.    



             Mylan    



             brand    



             patch only    

 

 Fentanyl    Hx  Patches  50mcg/HR  10uni  si    Calderon    72HR    ts  applied    Yair,



   -          q72h as    DO, MPH



   08/10          directed    



             chronic    



             pain    



             patient,    



             mylan    



             brand only    

 

 Fentanyl    Hx  Patches  50mcg/HR  10uni  si    Calderon    72HR    ts  applied    Yair,



   -          q72h    DO, MPH



             replacemen    



             t script    



             documented    



             reaction    

 

 Amitiza    Hx  Capsules  8mcg  60cap  si    Calderon        s  twice a    Yair,



   -          day    DO, MPH



                 

 

 Fentanyl    Hx  Patches  37.5mcg/H  15uni  si    Calderon    72HR  R  ts  patch    Yair,



   -          every 48    DO, MPH



             hours as    



   /2016          directed    



             chronic    



             pain.    



             increase    



             dose    

 

 Medrol (Cory)    Hx  Tablets  4mg  21tab  sig: as    Calderon        s  directed    Yair,



   -              DO, MPH



                 

 

 Fentanyl    Hx  Patches  25mcg/HR  10uni  si    Calderon    72HR    ts  applied    Yair,



   -          q48h as    DO, MPH



             chronic    



             pain    



             patient    

 

 Movantik    Hx  Tablets  25mg  30tab  1 by mouth    Calderon,



           s  in the    Yair,



   -          morning    DO, MPH



                 

 

 Duragesic-25    Hx  Patches  25mcg/HR  15uni  si    Calderon    72HR    ts  applied    Yair,



   -          q48h as    DO, MPH



             chronic    



             pain    



             patient    

 

 Oxycodone HCL    Hx  Tablets  15mg  180ta  si-2 by    Calderon,



           bs  mouth    Yair,



   -          every 4-6    DO, MPH



   /          hours as    



   /2016          directed    



             chronic    



             pain    



             patient    

 

 Fentanyl    Hx  Patches  37.5mcg/H  12uni  si    Calderon    72HR  R  ts  patch    Yair,



   -          every 48    DO, MPH



   07/29          hours as    



   /2015          directed    



             chronic    



             pain.    



             increase    



             dose    

 

 Skelaxin    Hx  Tablets  800mg  90tab  take 1/2    Calderon,



           s  to 1    Yair,



   -          tablet by    , MPH



             mouth    



             every 8    



             hours as    



             directed    



             chronic    



             pain.    

 

 Fentanyl    Hx  Patches  25mcg/HR  15uni  si    Calderon    72HR    ts  applied    Yair,



   -          q48h as    , MPH



             directed    



             chronic    



             pain    



             patient alma ford    

 

 Oxycontin    Hx  T12a  10mg  60uni  take 1 po    Calderon,



           ts  [id number]    Yair,



   -          ud chronic    , MPH



             pain    



             patient    

 

 Oxycodone HCL    Hx  Tablets  10mg  180ta  si by    Calderon,



           bs  mouth    Yair,



   -          every 4 to    DO, MPH



             6 hours as    



             directed    



             chronic    



             pain    



             patient    

 

 Opana    Hx  Tablets  5mg  120ta  si po    Calderon,



           bs  q4-6h ud    Yair,



   -              DO, MPH



   12/15              



   /2014              



                 



                 



                 



             chronic    



             pain    



             patient    

 

 Oxycodone HCL  11/10  Hx  Tablets  10mg  90tab  si po    Calderon,



           s  q8h ud    Yair,



   -              DO, MPH



                 



                 



                 



             chronic    



             pain    



             patient    

 

 Oxycodone HCL    Hx  Tablets  10mg  180ta  si-2 po    Calderon        bs  q6h ud    Yair,



   -              DO, MPH



   11/10          chronic    



             pain    



             patient    

 

 Oxycontin    Hx  T12a  10mg  60uni  take 1 po    Calderon,



           ts  q12 hours    Yair,



   -          ud chronic    DO, MPH



             pain    



             patient    

 

 Nucynta ER    Hx  Tablets ER  50mg  60tab  1 po q12h    Calderon,



       12HR    s  izabela    Yair,



   -              DO, MPH



                 

 

 Oxycodone HCL    Hx  Tablets  10mg  120ta  si po    Calderon,



           bs  q6h ud    Yair,



   -              DO, MPH



              chronic    



             pain    



             patient    

 

 Oxycodone HCL    Hx  Tablets  5mg  90tab  si po    Calderon,



           s  q8h ud    Yair,



   -              DO, MPH



                 



                 



                 



             chronic    



             pain    



             patient    

 

 Oxycodone HCL    Hx  Tablets  5mg  90tab  si po    Calderon,



           s  q8h     Sandy Mazariegos DO, MPH



                 



                 



                 



             chronic    



             pain    



             patient    

 

 Duloxetine HCL    Hx  Caps DR  60mg  60cap  1 po q12    Calderon,



       Part    s  hrs    Sandy Mazariegos DO, MPH



                 

 

 Lyrica    Hx  Capsules  75mg  60cap  sipo    Unknown



   /0000        s  Tid     



   -              



                 



                 



                 



                 



             chronic    



             pain    



             patient    

 

 Metaxalone    Hx  Tablets  800mg    1 PO 4    Unknown



   /0000          Times    



   -          Daily    



                 

 

 Zolpidem Tartrate    Hx  Tablets  5mg  5tabs  si po    Unknown



   /0000          qhs     



   -              



                 

 

 Meloxicam    Hx  Tablets  7.5mg  30tab  si-3    Unknown



   /0000        s  by mouth    



   -          every day    



                 

 

 Folic Acid    Hx  Tablets  1mg  30tab  daily    Unknown



   /0000        s      



   -              



                 

 

 Senna Lax    Hx  Tablets  8.6mg  30tab  daily    Unknown



   /0000        s      



   -              



                 

 

 Methotrexate    Hx  Tablets  2.5mg  16tab  4x a week    Unknown



   /0000        s      



   -              



                 

 

 Lunesta    Hx  Tablets  1mg        Unknown



   /0000              



   -              



                 

 

 Provigil    Hx  Tablets  200mg    si by    Unknown



   /0000          mouth    



   -          every    



   10/21          morning as    



   /2018          directed    







Immunizations







 Description

 

 No Information Available







Vital Signs







 Date  Vital  Result  Comment

 

 2019  9:39am  BP Systolic  132 mmHg  









 BP Diastolic  84 mmHg  

 

 Heart Rate  86 /min  

 

 Respiratory Rate  20 /min  

 

 Height  68 inches  5'8"

 

 Weight  150.00 lb  

 

 Pain Level  8  Pain at this time.

 

 Pain Level With Medicine  7  on average with meds

 

 Pain Level Without Medicine  10  10/10 without meds

 

 BMI (Body Mass Index)  22.8 kg/m2  









 2018 11:53am  BP Systolic  126 mmHg  









 BP Diastolic  84 mmHg  

 

 Heart Rate  86 /min  

 

 Respiratory Rate  20 /min  

 

 Height  68 inches  5'8"

 

 Weight  145.00 lb  

 

 Pain Level  9  Pain at this time.

 

 Pain Level With Medicine  8  on average with meds

 

 Pain Level Without Medicine  10  10/10 without meds

 

 BMI (Body Mass Index)  22.0 kg/m2  









 2018  2:30pm  BP Systolic  140 mmHg  









 BP Diastolic  88 mmHg  

 

 Heart Rate  84 /min  

 

 Respiratory Rate  20 /min  

 

 Height  68 inches  5'8"

 

 Weight  148.00 lb  

 

 Pain Level  8  Pain at this time.

 

 Pain Level With Medicine  7  on average with meds

 

 Pain Level Without Medicine  10  10/10 without meds

 

 BMI (Body Mass Index)  22.5 kg/m2  









 10/23/2018  2:35pm  BP Systolic  144 mmHg  









 BP Diastolic  86 mmHg  

 

 Heart Rate  88 /min  

 

 Respiratory Rate  20 /min  

 

 Height  68 inches  5'8"

 

 Weight  140.00 lb  

 

 Pain Level  8  Pain at this time.

 

 Pain Level With Medicine  7  on average with meds

 

 Pain Level Without Medicine  10  10/10 without meds

 

 BMI (Body Mass Index)  21.3 kg/m2  









 10/15/2018 10:44am  BP Systolic  136 mmHg  









 BP Diastolic  86 mmHg  

 

 Heart Rate  84 /min  

 

 Respiratory Rate  20 /min  

 

 Height  68 inches  5'8"

 

 Weight  144.00 lb  

 

 Pain Level  9  Pain at this time.

 

 Pain Level With Medicine  8  on average with meds

 

 Pain Level Without Medicine  10  10/10 without meds

 

 BMI (Body Mass Index)  21.9 kg/m2  









 2018  3:29pm  BP Systolic  120 mmHg  









 BP Diastolic  74 mmHg  

 

 Heart Rate  76 /min  

 

 Respiratory Rate  20 /min  

 

 Height  68 inches  5'8"

 

 Weight  144.00 lb  

 

 Pain Level  6  Pain at this time.

 

 Pain Level With Medicine  5  on average with meds

 

 Pain Level Without Medicine  10  10/10 without meds

 

 BMI (Body Mass Index)  21.9 kg/m2  









 2018  2:42pm  BP Systolic  136 mmHg  









 BP Diastolic  88 mmHg  

 

 Heart Rate  86 /min  

 

 Respiratory Rate  20 /min  

 

 Height  68 inches  5'8"

 

 Weight  144.00 lb  

 

 Pain Level  7  Pain at this time.

 

 Pain Level With Medicine  6  on average with meds

 

 Pain Level Without Medicine  10  10/10 without meds

 

 BMI (Body Mass Index)  21.9 kg/m2  









 2018  2:29pm  BP Systolic  118 mmHg  









 BP Diastolic  78 mmHg  

 

 Heart Rate  74 /min  

 

 Respiratory Rate  20 /min  

 

 Height  68 inches  5'8"

 

 Weight  147.00 lb  

 

 Pain Level  6  Pain at this time.

 

 Pain Level With Medicine  5  on average with meds

 

 Pain Level Without Medicine  10  10/10 without meds

 

 BMI (Body Mass Index)  22.3 kg/m2  









 2018  3:09pm  BP Systolic  126 mmHg  









 BP Diastolic  78 mmHg  

 

 Heart Rate  74 /min  

 

 Respiratory Rate  20 /min  

 

 Height  68 inches  5'8"

 

 Weight  147.00 lb  

 

 Pain Level  7  Pain at this time.

 

 Pain Level With Medicine  6  on average with meds

 

 Pain Level Without Medicine  10  10/10 without meds

 

 Pain Level After Procedure  6  

 

 BP Systolic Recheck  124 mmHg  Pulse:72

 

 BP Diastolic Recheck  70 mmHg  Pulse:72

 

 BMI (Body Mass Index)  22.3 kg/m2  









 05/15/2018  3:02pm  BP Systolic  116 mmHg  









 BP Diastolic  74 mmHg  

 

 Heart Rate  76 /min  

 

 Respiratory Rate  20 /min  

 

 Height  68 inches  5'8"

 

 Weight  147.00 lb  

 

 Pain Level  7  Pain at this time.

 

 Pain Level With Medicine  6  on average with meds

 

 Pain Level Without Medicine  10  10/10 without meds

 

 BMI (Body Mass Index)  22.3 kg/m2  









 2018  2:37pm  BP Systolic  120 mmHg  









 BP Diastolic  74 mmHg  

 

 Heart Rate  72 /min  

 

 Respiratory Rate  20 /min  

 

 Height  68 inches  5'8"

 

 Weight  155.00 lb  

 

 Pain Level  4  Pain at this time.

 

 Pain Level With Medicine  4  on average with meds

 

 Pain Level Without Medicine  10  10/10 without meds

 

 BMI (Body Mass Index)  23.6 kg/m2  









 03/15/2018  1:52pm  BP Systolic  120 mmHg  









 BP Diastolic  74 mmHg  

 

 Heart Rate  76 /min  

 

 Respiratory Rate  20 /min  

 

 Height  68 inches  5'8"

 

 Weight  156.00 lb  

 

 Pain Level  8  Pain at this time.

 

 Pain Level With Medicine  7  on average with meds

 

 Pain Level Without Medicine  10  10/10 without meds

 

 BMI (Body Mass Index)  23.7 kg/m2  









 2018 11:03am  BP Systolic  128 mmHg  









 BP Diastolic  76 mmHg  

 

 Heart Rate  74 /min  

 

 Respiratory Rate  20 /min  

 

 Height  68 inches  5'8"

 

 Weight  156.00 lb  

 

 Pain Level  7  Pain at this time.

 

 Pain Level With Medicine  6  on average with meds

 

 Pain Level Without Medicine  10  10/10 without meds

 

 BMI (Body Mass Index)  23.7 kg/m2  









 2018 11:58am  BP Systolic  118 mmHg  









 BP Diastolic  70 mmHg  

 

 Heart Rate  72 /min  

 

 Respiratory Rate  18 /min  

 

 Height  68 inches  5'8"

 

 Weight  154.00 lb  

 

 Pain Level  8  Pain at this time.

 

 Pain Level With Medicine  7  on average with meds

 

 Pain Level Without Medicine  10  10/10 without meds

 

 Pain Level After Procedure  6  

 

 BP Systolic Recheck  120 mmHg  Pulse: 76

 

 BP Diastolic Recheck  74 mmHg  Pulse: 76

 

 BMI (Body Mass Index)  23.4 kg/m2  









 2017 11:27am  BP Systolic  126 mmHg  









 BP Diastolic  74 mmHg  

 

 Heart Rate  72 /min  

 

 Respiratory Rate  20 /min  

 

 Height  68 inches  5'8"

 

 Weight  156.00 lb  

 

 Pain Level  6  Pain at this time.

 

 Pain Level With Medicine  5  on average with meds

 

 Pain Level Without Medicine  20  10/10 without meds

 

 BMI (Body Mass Index)  23.7 kg/m2  









 11/10/2017  2:55pm  BP Systolic  124 mmHg  









 BP Diastolic  80 mmHg  

 

 Heart Rate  84 /min  

 

 Respiratory Rate  20 /min  

 

 Height  68 inches  5'8"

 

 Weight  156.00 lb  

 

 Pain Level  7  Pain at this time.

 

 Pain Level With Medicine  6  on average with meds

 

 Pain Level Without Medicine  10  10/10 without meds

 

 BMI (Body Mass Index)  23.7 kg/m2  









 10/10/2017  3:00pm  BP Systolic  122 mmHg  









 BP Diastolic  74 mmHg  

 

 Heart Rate  76 /min  

 

 Respiratory Rate  20 /min  

 

 Height  68 inches  5'8"

 

 Weight  156.00 lb  

 

 Pain Level  7  Pain at this time.

 

 Pain Level With Medicine  6  on average with meds

 

 Pain Level Without Medicine  10  10/10 without meds

 

 Pain Level After Procedure  5  

 

 BP Systolic Recheck  128 mmHg  Pulse: 84

 

 BP Diastolic Recheck  86 mmHg  Pulse: 84

 

 BMI (Body Mass Index)  23.7 kg/m2  









 2017  2:32pm  BP Systolic  122 mmHg  









 BP Diastolic  70 mmHg  

 

 Heart Rate  74 /min  

 

 Respiratory Rate  20 /min  

 

 Height  68 inches  5'8"

 

 Weight  154.00 lb  

 

 Pain Level  7  Pain at this time.

 

 Pain Level With Medicine  7  on average with meds

 

 Pain Level Without Medicine  10  10/10 without meds

 

 BMI (Body Mass Index)  23.4 kg/m2  









 08/10/2017  8:58am  BP Systolic  126 mmHg  









 BP Diastolic  78 mmHg  

 

 Heart Rate  74 /min  

 

 Respiratory Rate  20 /min  

 

 Height  68 inches  5'8"

 

 Weight  157.00 lb  

 

 Pain Level  4  Pain at this time.

 

 Pain Level With Medicine  4  on average with meds

 

 Pain Level Without Medicine  10  10/10 without meds

 

 BMI (Body Mass Index)  23.9 kg/m2  









 2017  2:44pm  BP Systolic  128 mmHg  









 BP Diastolic  80 mmHg  

 

 Heart Rate  88 /min  

 

 Respiratory Rate  18 /min  

 

 Height  68 inches  5'8"

 

 Weight  163.00 lb  

 

 Pain Level  8  Pain at this time.

 

 Pain Level With Medicine  6  on average with meds

 

 Pain Level Without Medicine  10  10/10 without meds

 

 BMI (Body Mass Index)  24.8 kg/m2  









 2017  1:03pm  BP Systolic  122 mmHg  









 BP Diastolic  70 mmHg  

 

 Heart Rate  74 /min  

 

 Respiratory Rate  20 /min  

 

 Height  68 inches  5'8"

 

 Weight  158.00 lb  

 

 Pain Level  8  Pain at this time.

 

 Pain Level With Medicine  7  on average with meds

 

 Pain Level Without Medicine  10  10/10 without meds

 

 BMI (Body Mass Index)  24.0 kg/m2  









 2017  2:56pm  BP Systolic  122 mmHg  









 BP Diastolic  74 mmHg  

 

 Heart Rate  72 /min  

 

 Respiratory Rate  20 /min  

 

 Height  68 inches  5'8"

 

 Weight  159.00 lb  

 

 Pain Level  6  Pain at this time.

 

 Pain Level With Medicine  6  on average with meds

 

 Pain Level Without Medicine  10  10/10 without meds

 

 BMI (Body Mass Index)  24.2 kg/m2  









 2017 11:02am  BP Systolic  122 mmHg  









 BP Diastolic  74 mmHg  

 

 Heart Rate  70 /min  

 

 Respiratory Rate  20 /min  

 

 Height  68 inches  5'8"

 

 Weight  153.00 lb  

 

 Pain Level  6  Pain at this time.

 

 Pain Level With Medicine  5  on average with meds

 

 Pain Level Without Medicine  10  10/10 without meds

 

 BMI (Body Mass Index)  23.3 kg/m2  









 2017  3:08pm  BP Systolic  128 mmHg  









 BP Diastolic  78 mmHg  

 

 Heart Rate  80 /min  

 

 Respiratory Rate  20 /min  

 

 Height  68 inches  5'8"

 

 Weight  166.00 lb  

 

 Pain Level  9  Pain at this time.

 

 Pain Level With Medicine  9  on average with meds

 

 Pain Level Without Medicine  10  10/10 without meds

 

 BMI (Body Mass Index)  25.2 kg/m2  









 02/10/2017  2:17pm  BP Systolic  128 mmHg  









 BP Diastolic  80 mmHg  

 

 Heart Rate  76 /min  

 

 Respiratory Rate  16 /min  

 

 Height  68 inches  5'8"

 

 Weight  171.00 lb  

 

 Pain Level  7  7/10, Pain at this time.

 

 Pain Level With Medicine  7  7/10, on average with meds

 

 Pain Level Without Medicine  10  10/10 without meds

 

 BMI (Body Mass Index)  26.0 kg/m2  









 2017 11:16am  BP Systolic  122 mmHg  









 BP Diastolic  74 mmHg  

 

 Heart Rate  76 /min  

 

 Respiratory Rate  18 /min  

 

 Height  68 inches  5'8"

 

 Weight  17.00 lb  

 

 Pain Level  7  Pain at this time.

 

 Pain Level With Medicine  6  on average with meds

 

 Pain Level Without Medicine  10  10/10 without meds

 

 BMI (Body Mass Index)  2.6 kg/m2  









 2016 11:37am  BP Systolic  132 mmHg  









 BP Diastolic  78 mmHg  

 

 Heart Rate  80 /min  

 

 Respiratory Rate  20 /min  

 

 Height  68 inches  5'8"

 

 Pain Level Without Medicine  10  10/10 without meds









 2016  1:50pm  BP Systolic  132 mmHg  









 BP Diastolic  86 mmHg  

 

 Heart Rate  84 /min  

 

 Respiratory Rate  20 /min  

 

 Height  68 inches  5'8"

 

 Weight  174.00 lb  

 

 Pain Level  8  Pain at this time.

 

 Pain Level With Medicine  8  on average with meds

 

 Pain Level Without Medicine  10  10/10 without meds

 

 Pain Level After Procedure  3  

 

 BP Systolic Recheck  124 mmHg  Pulse: 80

 

 BP Diastolic Recheck  76 mmHg  Pulse: 80

 

 BMI (Body Mass Index)  26.5 kg/m2  









 2016 11:26am  BP Systolic  148 mmHg  









 BP Diastolic  86 mmHg  

 

 Heart Rate  84 /min  

 

 Respiratory Rate  18 /min  

 

 Height  68 inches  5'8"

 

 Weight  174.00 lb  

 

 Pain Level  7  Pain at this time.

 

 Pain Level With Medicine  6  on average with meds

 

 Pain Level Without Medicine  10  10/10 without meds

 

 BMI (Body Mass Index)  26.5 kg/m2  









 10/07/2016  1:38pm  BP Systolic  158 mmHg  









 BP Diastolic  78 mmHg  

 

 Heart Rate  80 /min  

 

 Respiratory Rate  18 /min  

 

 Height  68 inches  5'8"

 

 Weight  174.00 lb  

 

 Pain Level  9  Pain at this time.

 

 Pain Level With Medicine  8  on average with meds

 

 Pain Level Without Medicine  10  10/10 without meds

 

 BMI (Body Mass Index)  26.5 kg/m2  









 2016  1:23pm  BP Systolic  126 mmHg  









 BP Diastolic  74 mmHg  

 

 Heart Rate  72 /min  

 

 Respiratory Rate  18 /min  

 

 Height  68 inches  5'8"

 

 Weight  174.00 lb  

 

 Pain Level  7  Pain at this time.

 

 Pain Level With Medicine  5  on average with meds

 

 Pain Level Without Medicine  10  10/10 without meds

 

 BMI (Body Mass Index)  26.5 kg/m2  









 2016  1:26pm  BP Systolic  126 mmHg  









 BP Diastolic  78 mmHg  

 

 Heart Rate  74 /min  

 

 Respiratory Rate  18 /min  

 

 Height  68 inches  5'8"

 

 Weight  169.00 lb  

 

 Pain Level  7  Pain at this time.

 

 Pain Level With Medicine  6  on average with meds

 

 Pain Level Without Medicine  10  10/10 without meds

 

 BMI (Body Mass Index)  25.7 kg/m2  









 08/10/2016  9:04am  BP Systolic  122 mmHg  









 BP Diastolic  76 mmHg  

 

 Heart Rate  74 /min  

 

 Respiratory Rate  20 /min  

 

 Height  68 inches  5'8"

 

 Weight  171.00 lb  

 

 Pain Level  7  Pain at this time.

 

 Pain Level With Medicine  6  on average with meds

 

 Pain Level Without Medicine  10  10/10 without meds

 

 BMI (Body Mass Index)  26.0 kg/m2  









 2016  1:57pm  BP Systolic  124 mmHg  









 BP Diastolic  76 mmHg  

 

 Heart Rate  74 /min  

 

 Respiratory Rate  18 /min  

 

 Height  68 inches  5'8"

 

 Weight  171.00 lb  

 

 Pain Level  7  Pain at this time.

 

 Pain Level With Medicine  6  on average with meds

 

 Pain Level Without Medicine  10  10/10 without meds

 

 BMI (Body Mass Index)  26.0 kg/m2  









 2016 11:51am  BP Systolic  136 mmHg  









 BP Diastolic  80 mmHg  

 

 Heart Rate  80 /min  

 

 Respiratory Rate  18 /min  

 

 Height  68 inches  5'8"

 

 Weight  174.00 lb  

 

 Pain Level  9  Pain at this time.

 

 Pain Level With Medicine  8  on average with meds

 

 Pain Level Without Medicine  10  10/10 without meds

 

 BMI (Body Mass Index)  26.5 kg/m2  









 2016 12:00pm  BP Systolic  128 mmHg  









 BP Diastolic  82 mmHg  

 

 Heart Rate  80 /min  

 

 Respiratory Rate  20 /min  

 

 Height  68 inches  5'8"

 

 Weight  170.00 lb  

 

 Pain Level  7  Pain at this time.

 

 Pain Level With Medicine  6  on average with meds

 

 Pain Level Without Medicine  10  10/10 without meds

 

 BMI (Body Mass Index)  25.8 kg/m2  









 2016 11:57am  BP Systolic  120 mmHg  









 BP Diastolic  76 mmHg  

 

 Heart Rate  74 /min  

 

 Respiratory Rate  20 /min  

 

 Height  68 inches  5'8"

 

 Weight  170.00 lb  

 

 Pain Level  5  Pain at this time.

 

 Pain Level With Medicine  7  on average with meds

 

 Pain Level Without Medicine  10  10/10 without meds

 

 BMI (Body Mass Index)  25.8 kg/m2  









 2016 11:09am  BP Systolic  128 mmHg  









 BP Diastolic  84 mmHg  

 

 Heart Rate  80 /min  

 

 Respiratory Rate  20 /min  

 

 Height  68 inches  5'8"

 

 Weight  168.00 lb  

 

 Pain Level  6  Pain at this time.

 

 Pain Level With Medicine  6  on average with meds

 

 Pain Level Without Medicine  10  10/10 without meds

 

 BMI (Body Mass Index)  25.5 kg/m2  









 2016 12:00pm  BP Systolic  136 mmHg  









 BP Diastolic  84 mmHg  

 

 Heart Rate  88 /min  

 

 Respiratory Rate  20 /min  

 

 Height  68 inches  5'8"

 

 Weight  168.00 lb  

 

 Pain Level  7  Pain at this time.

 

 Pain Level With Medicine  6  on average with meds

 

 Pain Level Without Medicine  10  10/10 without meds

 

 BMI (Body Mass Index)  25.5 kg/m2  









 2016  2:01pm  BP Systolic  126 mmHg  









 BP Diastolic  76 mmHg  

 

 Heart Rate  74 /min  

 

 Respiratory Rate  20 /min  

 

 Height  68 inches  5'8"

 

 Weight  172.00 lb  

 

 Pain Level  8  Pain at this time.

 

 Pain Level With Medicine  7  on average with meds

 

 Pain Level Without Medicine  10  10/10 without meds

 

 BMI (Body Mass Index)  26.1 kg/m2  









 2016 10:32am  BP Systolic  126 mmHg  









 BP Diastolic  78 mmHg  

 

 Heart Rate  76 /min  

 

 Respiratory Rate  18 /min  

 

 Height  68 inches  5'8"

 

 Weight  174.00 lb  

 

 Pain Level  8  /10, Pain at this time.

 

 Pain Level With Medicine  7  7/10, on average with meds

 

 Pain Level Without Medicine  10  10/10 without meds

 

 BMI (Body Mass Index)  26.5 kg/m2  









 2016 10:27am  BP Systolic  126 mmHg  









 BP Diastolic  80 mmHg  

 

 Heart Rate  84 /min  

 

 Respiratory Rate  20 /min  

 

 Height  68 inches  5'8"

 

 Weight  174.00 lb  

 

 Pain Level  8  Pain at this time.

 

 Pain Level With Medicine  7  on average with meds

 

 Pain Level Without Medicine  10  10/10 without meds

 

 BMI (Body Mass Index)  26.5 kg/m2  









 2016  2:46pm  BP Systolic  128 mmHg  









 BP Diastolic  82 mmHg  

 

 Heart Rate  84 /min  

 

 Respiratory Rate  20 /min  

 

 Height  68 inches  5'8"

 

 Weight  176.00 lb  

 

 Pain Level  9  Pain at this time.

 

 Pain Level With Medicine  8  on average with meds

 

 Pain Level Without Medicine  10  10/10 without meds

 

 BMI (Body Mass Index)  26.8 kg/m2  









 2015  3:05pm  BP Systolic  128 mmHg  









 BP Diastolic  80 mmHg  

 

 Heart Rate  82 /min  

 

 Respiratory Rate  20 /min  

 

 Height  68 inches  5'8"

 

 Weight  175.00 lb  

 

 Pain Level  8  Pain at this time.

 

 Pain Level With Medicine  7  on average with meds

 

 Pain Level Without Medicine  10  10/10 without meds

 

 BMI (Body Mass Index)  26.6 kg/m2  









 2015  1:35pm  BP Systolic  136 mmHg  









 BP Diastolic  82 mmHg  

 

 Heart Rate  80 /min  

 

 Respiratory Rate  20 /min  

 

 Height  68 inches  5'8"

 

 Weight  182.00 lb  

 

 Pain Level  6  Pain at this time.

 

 Pain Level With Medicine  5  on average with meds

 

 Pain Level Without Medicine  10  10/10 without meds

 

 Pain Level After Procedure  5  

 

 BP Systolic Recheck  140 mmHg  Pulse: 84

 

 BP Diastolic Recheck  86 mmHg  Pulse: 84

 

 BMI (Body Mass Index)  27.7 kg/m2  









 10/23/2015  2:12pm  BP Systolic  128 mmHg  









 BP Diastolic  80 mmHg  

 

 Heart Rate  84 /min  

 

 Respiratory Rate  20 /min  

 

 Height  68 inches  5'8"

 

 Pain Level  6  Pain at this time.

 

 Pain Level With Medicine  5  on average with meds

 

 Pain Level Without Medicine  10  10/10 without meds









 2015  1:55pm  BP Systolic  130 mmHg  









 BP Diastolic  74 mmHg  

 

 Heart Rate  76 /min  

 

 Respiratory Rate  18 /min  

 

 Height  68 inches  5'8"

 

 Weight  184.00 lb  

 

 Pain Level  6  6/10, Pain at this time.

 

 Pain Level With Medicine  6  6/10, on average with meds

 

 Pain Level Without Medicine  10  10/10 without meds

 

 BMI (Body Mass Index)  28.0 kg/m2  









 2015  9:44am  BP Systolic  126 mmHg  









 BP Diastolic  78 mmHg  

 

 Heart Rate  74 /min  

 

 Respiratory Rate  18 /min  

 

 Height  68 inches  5'8"

 

 Weight  184.00 lb  

 

 Pain Level  6  6/10, Pain at this time.

 

 Pain Level With Medicine  5  5/10, on average with meds

 

 Pain Level Without Medicine  10  10/10 without meds

 

 Pain Level After Procedure  5  

 

 BP Systolic Recheck  122 mmHg  Pulse:72

 

 BP Diastolic Recheck  74 mmHg  Pulse:72

 

 BMI (Body Mass Index)  28.0 kg/m2  









 2015 10:36am  BP Systolic  126 mmHg  









 BP Diastolic  74 mmHg  

 

 Heart Rate  70 /min  

 

 Respiratory Rate  18 /min  

 

 Height  68 inches  5'8"

 

 Weight  185.00 lb  

 

 Pain Level  9  Pain at this time.

 

 Pain Level With Medicine  9  on average with meds

 

 Pain Level Without Medicine  10  10/10 without meds

 

 BMI (Body Mass Index)  28.1 kg/m2  









 2015  3:58pm  BP Systolic  134 mmHg  









 BP Diastolic  80 mmHg  

 

 Heart Rate  84 /min  

 

 Respiratory Rate  18 /min  

 

 Height  68 inches  5'8"

 

 Weight  183.00 lb  

 

 Pain Level  9  Pain at this time.

 

 Pain Level With Medicine  8  on average with meds

 

 Pain Level Without Medicine  10  10/10 without meds

 

 BMI (Body Mass Index)  27.8 kg/m2  









 2015  3:40pm  BP Systolic  138 mmHg  









 BP Diastolic  84 mmHg  

 

 Heart Rate  86 /min  

 

 Respiratory Rate  20 /min  

 

 Height  68 inches  5'8"

 

 Weight  188.00 lb  

 

 Pain Level  9  Pain at this time.

 

 Pain Level With Medicine  8  on average with meds

 

 Pain Level Without Medicine  10  10/10 without meds

 

 BMI (Body Mass Index)  28.6 kg/m2  









 2015  4:12pm  BP Systolic  126 mmHg  









 BP Diastolic  74 mmHg  

 

 Heart Rate  76 /min  

 

 Respiratory Rate  18 /min  

 

 Height  68 inches  5'8"

 

 Weight  188.00 lb  

 

 Pain Level  6  6/10, Pain at this time.

 

 Pain Level With Medicine  6  6/10, on average with meds

 

 Pain Level Without Medicine  10  1010 without meds

 

 BMI (Body Mass Index)  28.6 kg/m2  









 2015  4:43pm  BP Systolic  128 mmHg  









 BP Diastolic  76 mmHg  

 

 Heart Rate  80 /min  

 

 Respiratory Rate  18 /min  

 

 Height  68 inches  5'8"

 

 Weight  191.00 lb  

 

 Pain Level  8  8/10, Pain at this time.

 

 Pain Level With Medicine  6  6/10, on average with meds

 

 Pain Level Without Medicine  10  10/10 without meds

 

 BMI (Body Mass Index)  29.0 kg/m2  









 2015  3:47pm  BP Systolic  126 mmHg  









 BP Diastolic  74 mmHg  

 

 Heart Rate  76 /min  

 

 Respiratory Rate  18 /min  

 

 Height  68 inches  5'8"

 

 Weight  192.00 lb  

 

 Pain Level  8  8/10, Pain at this time.

 

 Pain Level With Medicine  7  7/10, on average with meds

 

 Pain Level Without Medicine  10  10/10 without meds

 

 Pain Level After Procedure  6  

 

 BP Systolic Recheck  132 mmHg  Pulse: 78

 

 BP Diastolic Recheck  72 mmHg  Pulse: 78

 

 BMI (Body Mass Index)  29.2 kg/m2  









 2015  4:00pm  BP Systolic  142 mmHg  









 BP Diastolic  78 mmHg  

 

 Heart Rate  74 /min  

 

 Respiratory Rate  18 /min  

 

 Height  68 inches  5'8"

 

 Weight  191.00 lb  

 

 Pain Level  8  8/10, Pain at this time.

 

 Pain Level With Medicine  7  7/10, on average with meds

 

 Pain Level Without Medicine  10  10/10 without meds

 

 BMI (Body Mass Index)  29.0 kg/m2  









 2015  4:04pm  BP Systolic  120 mmHg  









 BP Diastolic  76 mmHg  

 

 Heart Rate  74 /min  

 

 Respiratory Rate  18 /min  

 

 Height  68 inches  5'8"

 

 Weight  188.00 lb  

 

 Pain Level  8  Pain at this time.

 

 Pain Level With Medicine  7  on average with meds

 

 Pain Level Without Medicine  10  10/10 without meds

 

 BMI (Body Mass Index)  28.6 kg/m2  









 2015  4:08pm  BP Systolic  132 mmHg  









 BP Diastolic  84 mmHg  

 

 Heart Rate  88 /min  

 

 Respiratory Rate  20 /min  

 

 Height  68 inches  5'8"

 

 Weight  192.00 lb  

 

 Pain Level  8  Pain at this time.

 

 Pain Level With Medicine  6  on average with meds

 

 Pain Level Without Medicine  10  10/10 without meds

 

 Pain Level After Procedure  5  

 

 BP Systolic Recheck  138 mmHg  Pulse: 92

 

 BP Diastolic Recheck  88 mmHg  Pulse: 92

 

 BMI (Body Mass Index)  29.2 kg/m2  









 2015 10:56am  BP Systolic  136 mmHg  









 BP Diastolic  84 mmHg  

 

 Heart Rate  80 /min  

 

 Respiratory Rate  20 /min  

 

 Height  68 inches  5'8"

 

 Weight  192.00 lb  

 

 Pain Level  7-8  Pain at this time.

 

 Pain Level With Medicine  7  on average with meds

 

 Pain Level Without Medicine  10  10/10 without meds

 

 BMI (Body Mass Index)  29.2 kg/m2  









 2015  2:59pm  BP Systolic  124 mmHg  









 BP Diastolic  76 mmHg  

 

 Heart Rate  74 /min  

 

 Respiratory Rate  18 /min  

 

 Height  68 inches  5'8"

 

 Weight  199.00 lb  

 

 Pain Level  7  Pain at this time.

 

 Pain Level With Medicine  5  on average with meds

 

 Pain Level Without Medicine  10  10/10 without meds

 

 Pain Level After Procedure  5  

 

 BP Systolic Recheck  130 mmHg  Pulse: 84

 

 BP Diastolic Recheck  80 mmHg  Pulse: 84

 

 BMI (Body Mass Index)  30.3 kg/m2  









 2015  9:48am  BP Systolic  124 mmHg  









 BP Diastolic  72 mmHg  

 

 Heart Rate  74 /min  

 

 Respiratory Rate  18 /min  

 

 Height  68 inches  5'8"

 

 Weight  194.00 lb  

 

 Pain Level  8  8/10, Pain at this time.

 

 Pain Level With Medicine  7  7/10, on average with meds

 

 Pain Level Without Medicine  10  10/10 without meds

 

 Pain Level After Procedure  6  

 

 BP Systolic Recheck  128 mmHg  Pulse: 80

 

 BP Diastolic Recheck  76 mmHg  Pulse: 80

 

 BMI (Body Mass Index)  29.5 kg/m2  









 12/15/2014  3:31pm  BP Systolic  128 mmHg  









 BP Diastolic  76 mmHg  

 

 Heart Rate  78 /min  

 

 Respiratory Rate  18 /min  

 

 Height  68 inches  5'8"

 

 Weight  194.00 lb  

 

 Pain Level  8  8/10, Pain at this time.

 

 Pain Level With Medicine  6  6/10, on average with meds

 

 Pain Level Without Medicine  10  10/10 without meds

 

 BMI (Body Mass Index)  29.5 kg/m2  









 2014  2:38pm  BP Systolic  144 mmHg  









 BP Diastolic  82 mmHg  

 

 Heart Rate  92 /min  

 

 Respiratory Rate  18 /min  

 

 Height  68 inches  5'8"

 

 Weight  194.00 lb  

 

 Pain Level  6  6/10, Pain at this time.

 

 Pain Level With Medicine  6  6-8/10, on average with meds

 

 Pain Level Without Medicine  10  10/10 without meds

 

 BMI (Body Mass Index)  29.5 kg/m2  









 11/10/2014  3:02pm  BP Systolic  122 mmHg  









 BP Diastolic  74 mmHg  

 

 Heart Rate  76 /min  

 

 Respiratory Rate  18 /min  

 

 Height  68 inches  5'8"

 

 Weight  194.00 lb  

 

 Pain Level  8  Pain at this time.

 

 Pain Level With Medicine  6  on average with meds

 

 Pain Level Without Medicine  10  10/10 without meds

 

 Pain Level After Procedure  5  

 

 BP Systolic Recheck  126 mmHg  Pulse: 74

 

 BP Diastolic Recheck  74 mmHg  Pulse: 74

 

 BMI (Body Mass Index)  29.5 kg/m2  









 10/10/2014 11:48am  BP Systolic  118 mmHg  









 BP Diastolic  78 mmHg  

 

 Heart Rate  72 /min  

 

 Respiratory Rate  18 /min  

 

 Height  68 inches  5'8"

 

 Weight  194.00 lb  

 

 Pain Level  8  8/10, Pain at this time.

 

 Pain Level With Medicine  8  8/10, on average with meds

 

 Pain Level Without Medicine  10  10/10 without meds

 

 BMI (Body Mass Index)  29.5 kg/m2  









 2014 10:09am  BP Systolic  126 mmHg  









 BP Diastolic  72 mmHg  

 

 Heart Rate  74 /min  

 

 Respiratory Rate  18 /min  

 

 Height  68 inches  5'8"

 

 Weight  196.00 lb  

 

 Pain Level  8  8/10, Pain at this time.

 

 Pain Level With Medicine  7  7/10, on average with meds

 

 Pain Level Without Medicine  10  10/10 without meds

 

 BMI (Body Mass Index)  29.8 kg/m2  









 2014  9:21am  BP Systolic  128 mmHg  









 BP Diastolic  76 mmHg  

 

 Heart Rate  76 /min  

 

 Respiratory Rate  18 /min  

 

 Height  68 inches  5'8"

 

 Weight  192.00 lb  

 

 Pain Level  7  Pain at this time.

 

 Pain Level With Medicine  6  on average with meds

 

 Pain Level Without Medicine  10  10/10 without meds

 

 BMI (Body Mass Index)  29.2 kg/m2  









 2014  3:53pm  BP Systolic  128 mmHg  









 BP Diastolic  78 mmHg  

 

 Heart Rate  74 /min  

 

 Respiratory Rate  18 /min  

 

 Height  68 inches  5'8"

 

 Weight  190.00 lb  

 

 Pain Level  7-8  Pain at this time.

 

 Pain Level With Medicine  7  on average with meds

 

 Pain Level Without Medicine  10  10/10 without meds

 

 Pain Level After Procedure  6  

 

 BP Systolic Recheck  128 mmHg  Pulse: 80

 

 BP Diastolic Recheck  76 mmHg  Pulse: 80

 

 BMI (Body Mass Index)  28.9 kg/m2  









 2014 11:42am  BP Systolic  132 mmHg  









 BP Diastolic  78 mmHg  

 

 Heart Rate  76 /min  

 

 Respiratory Rate  18 /min  

 

 Height  68 inches  5'8"

 

 Weight  191.00 lb  

 

 Pain Level  7-8  Pain at this time.

 

 Pain Level With Medicine  6  on average with meds

 

 Pain Level Without Medicine  10  10/10 without meds

 

 BMI (Body Mass Index)  29.0 kg/m2  









 2014 11:29am  BP Systolic  130 mmHg  









 BP Diastolic  72 mmHg  

 

 Heart Rate  76 /min  

 

 Respiratory Rate  18 /min  

 

 Height  68 inches  5'8"

 

 Weight  193.00 lb  

 

 Pain Level  8  8/10, Pain at this time.

 

 Pain Level With Medicine  8  810, on average with meds

 

 Pain Level Without Medicine  10  10/10 without meds

 

 Pain Level After Procedure  7  

 

 BP Systolic Recheck  132 mmHg  Pulse: 80

 

 BP Diastolic Recheck  78 mmHg  Pulse: 80

 

 BMI (Body Mass Index)  29.3 kg/m2  









 2014 10:47am  BP Systolic  126 mmHg  









 BP Diastolic  80 mmHg  

 

 Heart Rate  84 /min  

 

 Respiratory Rate  20 /min  

 

 Height  68 inches  5'8"

 

 Weight  192.00 lb  

 

 Pain Level  8  Pain at this time.

 

 Pain Level With Medicine  7  on average with meds

 

 Pain Level Without Medicine  10  10/10 without meds

 

 BMI (Body Mass Index)  29.2 kg/m2  









 2014 10:23am  BP Systolic  128 mmHg  









 BP Diastolic  80 mmHg  

 

 Heart Rate  76 /min  

 

 Respiratory Rate  18 /min  

 

 Height  68 inches  5'8"

 

 Weight  190.00 lb  

 

 Pain Level  7-8  Pain at this time.

 

 Pain Level With Medicine  6-7  on average with meds

 

 Pain Level Without Medicine  10  10/10 without meds

 

 Pain Level After Procedure  5  

 

 BP Systolic Recheck  128 mmHg  Pulse: 84

 

 BP Diastolic Recheck  84 mmHg  Pulse: 84

 

 BMI (Body Mass Index)  28.9 kg/m2  









 2014 11:13am  BP Systolic  124 mmHg  









 BP Diastolic  78 mmHg  

 

 Heart Rate  72 /min  

 

 Respiratory Rate  18 /min  

 

 Height  68 inches  5'8"

 

 Weight  190.00 lb  

 

 Pain Level  8  8/10, Pain at this time.

 

 Pain Level With Medicine  8  8/10, on average with meds

 

 Pain Level Without Medicine  10  10/10 without meds

 

 BMI (Body Mass Index)  28.9 kg/m2  









 2014  9:54am  BP Systolic  132 mmHg  









 BP Diastolic  76 mmHg  

 

 Heart Rate  72 /min  

 

 Respiratory Rate  18 /min  

 

 Height  68 inches  5'8"

 

 Weight  190.00 lb  

 

 Pain Level  7  7/10, Pain at this time.

 

 Pain Level Without Medicine  10  10/10 without meds

 

 BMI (Body Mass Index)  28.9 kg/m2  







Results







 Description

 

 No Information Available







Procedures







 Date  Code  Description  Status

 

 2018  47024  Omt  1-2 Body Regions  Completed

 

 10/15/2018  53776  Omt  3-4 Body Regions  Completed

 

 10/15/2018  77185  Injection For Nerve Block, Suprascapular Nerve  Completed

 

 10/15/2018  89833  Injection, Single Or Mutiple Trigger Points One Or Two  
Completed



     Muscles  

 

 10/15/2018  64348  Injection, Tendon Origin/Insertion  Completed

 

 10/15/2018  49304  Inject Tendon/Ligament  Completed

 

 2018  95262  Omt  3-4 Body Regions  Completed

 

 2018  99195  Therapeutic, Prophylactic Or Diagnostic Injection Subq/Im  
Completed

 

 2018  69753  Omt  3-4 Body Regions  Completed

 

 2018  65100  Omt  3-4 Body Regions  Completed

 

 201852  Injection, Single Or Mutiple Trigger Points One Or Two  
Completed



     Muscles  

 

 201851  Injection, Tendon Origin/Insertion  Completed

 

 2018  04166  Injection, Tendon Origin/Insertion  Completed

 

 2018  41819  Inject Tendon/Ligament  Completed

 

 2018  28461  Inject Tendon/Ligament  Completed

 

 05/15/2018  50825  Omt  3-4 Body Regions  Completed

 

 2018  62673  Omt  3-4 Body Regions  Completed

 

 03/15/2018  01470  Omt  3-4 Body Regions  Completed

 

 2018  13822  Omt  5-6 Body Regions  Completed

 

 2018  46732  Omt  3-4 Body Regions  Completed

 

 2018  24396  Injection For Nerve Block, Other Peripheral Nerve Or  
Completed



     Branch  

 

 2018  Injection, Single Or Mutiple Trigger Points One Or Two  
Completed



     Muscles  

 

 2017  30066  Omt  3-4 Body Regions  Completed

 

 11/10/2017  44365  Omt  3-4 Body Regions  Completed

 

 10/10/2017  10619  Inject Tendon/Ligament  Completed

 

 10/10/2017  81098  Inject Tendon/Ligament  Completed

 

 10/10/2017  56774  Inject Tendon/Ligament  Completed

 

 10/10/2017  08421  Inject Tendon/Ligament  Completed

 

 10/10/2017  13895  Injection, Tendon Origin/Insertion  Completed

 

 10/10/2017  78565  Injection, Tendon Origin/Insertion  Completed

 

 10/10/2017  08861  Injection, Single Or Mutiple Trigger Points One Or Two  
Completed



     Muscles  

 

 10/10/2017  06137  Injection For Nerve Block, Suprascapular Nerve  Completed

 

 10/10/2017  53464  Test Autonomic Nervous System, Sudomotor  Completed

 

 10/10/2017  42050  Omt  3-4 Body Regions  Completed

 

 2017  07782  Test Autonomic Nervous System, Sudomotor  Completed

 

 2017  42476  Therapeutic, Prophylactic Or Diagnostic Injection Subq/Im  
Completed

 

 2017  24198  Omt  5-6 Body Regions  Completed

 

 08/10/2017  00638  Omt  3-4 Body Regions  Completed

 

 2017  71742  Omt  3-4 Body Regions  Completed

 

 2017  89101  Omt  3-4 Body Regions  Completed

 

 201750  Inject Tendon/Ligament  Completed

 

 201750  Inject Tendon/Ligament  Completed

 

 201750  Inject Tendon/Ligament  Completed

 

 201750  Inject Tendon/Ligament  Completed

 

 201750  Inject Tendon/Ligament  Completed

 

 201750  Inject Tendon/Ligament  Completed

 

 201750  Inject Tendon/Ligament  Completed

 

 201750  Inject Tendon/Ligament  Completed

 

 201750  Inject Tendon/Ligament  Completed

 

 2017  43136  Omt  3-4 Body Regions  Completed

 

 2017  70817  Omt  3-4 Body Regions  Completed

 

 2017  49169  Omt  3-4 Body Regions  Completed

 

 2017  87070  Omt  3-4 Body Regions  Completed

 

 2016  16639  Omt  1-2 Body Regions  Completed

 

 2016  12832  Injection For Nerve Block, Suprascapular Nerve  Completed

 

 201652  Injection, Single Or Mutiple Trigger Points One Or Two  
Completed



     Muscles  

 

 201651  Injection, Tendon Origin/Insertion  Completed

 

 201650  Inject Tendon/Ligament  Completed

 

 201650  Inject Tendon/Ligament  Completed

 

 201651  Injection, Tendon Origin/Insertion  Completed

 

 201652  Injection, Single Or Mutiple Trigger Points One Or Two  
Completed



     Muscles  

 

 2016  99867  Injection For Nerve Block, Suprascapular Nerve  Completed

 

 2016  96586  Omt  3-4 Body Regions  Completed

 

 2016  37502  Test Autonomic Nervous System, Sudomotor  Completed

 

 10/07/2016  32870  Omt  3-4 Body Regions  Completed

 

 2016  03364  Omt  3-4 Body Regions  Completed

 

 2016  89882  Omt  3-4 Body Regions  Completed

 

 08/10/2016  83741  Omt  3-4 Body Regions  Completed

 

 2016  39802  Omt  3-4 Body Regions  Completed

 

 2016  65769  Omt  3-4 Body Regions  Completed

 

 2016  96464  Inject Tendon/Ligament  Completed

 

 201650  Inject Tendon/Ligament  Completed

 

 2016  93913  Inject Tendon/Ligament  Completed

 

 2016  40646  Inject Tendon/Ligament  Completed

 

 201650  Inject Tendon/Ligament  Completed

 

 2016  92340  Inject Tendon/Ligament  Completed

 

 201651  Injection, Tendon Origin/Insertion  Completed

 

 201651  Injection, Tendon Origin/Insertion  Completed

 

 2016  42054  Omt  3-4 Body Regions  Completed

 

 2016  04345  Omt  3-4 Body Regions  Completed

 

 2016  25022  Omt  3-4 Body Regions  Completed

 

 2016  89479  Omt  3-4 Body Regions  Completed

 

 2016  47826  Therapeutic, Prophylactic Or Diagnostic Injection Subq/Im  
Completed

 

 2016  00569  Omt  3-4 Body Regions  Completed

 

 2016  46228  Therapeutic, Prophylactic Or Diagnostic Injection Subq/Im  
Completed

 

 2016  14275  Omt  3-4 Body Regions  Completed

 

 2016  37566  Omt  3-4 Body Regions  Completed

 

 2015  91723  U/S Guidance For Needle Placement  Completed

 

 201550  Inject Tendon/Ligament  Completed

 

 10/23/2015  92369  Omt  3-4 Body Regions  Completed

 

 10/23/2015  82903  Therapeutic, Prophylactic Or Diagnostic Injection Subq/Im  
Completed

 

 2015  58661  Omt  3-4 Body Regions  Completed

 

 201551  Injection, Tendon Origin/Insertion  Completed

 

 201552  Injection, Single Or Mutiple Trigger Points One Or Two  
Completed



     Muscles  

 

 2015  92542  Injection For Nerve Block, Suprascapular Nerve  Completed

 

 2015  04555  Omt  1-2 Body Regions  Completed

 

 2015  77089  Omt  3-4 Body Regions  Completed

 

 2015  48062  Test Autonomic Nervous System, Sudomotor  Completed

 

 2015  23381  Omt  3-4 Body Regions  Completed

 

 2015  45616  Omt  3-4 Body Regions  Completed

 

 2015  92933  Omt  3-4 Body Regions  Completed

 

 201550  Inject Tendon/Ligament  Completed

 

 201551  Injection, Tendon Origin/Insertion  Completed

 

 201552  Injection, Single Or Mutiple Trigger Points One Or Two  
Completed



     Muscles  

 

 2015  87405  Injection For Nerve Block, Suprascapular Nerve  Completed

 

 2015  10281  Omt  3-4 Body Regions  Completed

 

 2015  55286  Omt  3-4 Body Regions  Completed

 

 2015  86933  Omt  1-2 Body Regions  Completed

 

 2015  18154  Omt  3-4 Body Regions  Completed

 

 2015  67224  Injection For Nerve Block, Suprascapular Nerve  Completed

 

 201552  Injection, Single Or Mutiple Trigger Points One Or Two  
Completed



     Muscles  

 

 201551  Injection, Tendon Origin/Insertion  Completed

 

 2015  29732  Omt  3-4 Body Regions  Completed

 

 2015  43325  Omt  3-4 Body Regions  Completed

 

 201550  Inject Tendon/Ligament  Completed

 

 201550  Inject Tendon/Ligament  Completed

 

 201550  Inject Tendon/Ligament  Completed

 

 2015  81044  Omt  3-4 Body Regions  Completed

 

 2015  23116  Injection For Nerve Block, Suprascapular Nerve  Completed

 

 201553  Injection Single Or Multiple Trigger Points Three Or More  
Completed



     Muscles  

 

 2014  45177  Omt  3-4 Body Regions  Completed

 

 11/10/2014  43588  Inject/Drain Arthrocentesis Major Joint/Bursa/Ganglion  
Completed



     Cyst  

 

 11/10/2014  98821  Omt  3-4 Body Regions  Completed

 

 10/10/2014  70540  Omt  3-4 Body Regions  Completed

 

 2014  94396  Omt  3-4 Body Regions  Completed

 

 2014  54776  Injection For Nerve Block, Suprascapular Nerve  Completed

 

 201452  Injection, Single Or Mutiple Trigger Points One Or Two  
Completed



     Muscles  

 

 201451  Injection, Tendon Origin/Insertion  Completed

 

 201450  Inject Tendon/Ligament  Completed

 

 201450  Inject Tendon/Ligament  Completed

 

 201451  Injection, Tendon Origin/Insertion  Completed

 

 201451  Injection, Tendon Origin/Insertion  Completed

 

 201453  Injection Single Or Multiple Trigger Points Three Or More  
Completed



     Muscles  

 

 2014  14113  Omt  3-4 Body Regions  Completed

 

 2014  62507  Omt  5-6 Body Regions  Completed

 

 2014  25854  U/S Guidance For Needle Placement  Completed

 

 201410  Inject/Drain Arthrocentesis Major Joint/Bursa/Ganglion  
Completed



     Cyst  

 

 2014  91577  U/S Guidance For Needle Placement  Completed

 

 201410  Inject/Drain Arthrocentesis Major Joint/Bursa/Ganglion  
Completed



     Cyst  







Encounters







 Type  Date  Location  Provider  Dx  Diagnosis

 

 Office Visit  2018  Main Office as Of  Yair Calderon DO  G89.29  Other 
chronic pain



   11:45a  14  MPH    









 M54.2  Cervicalgia

 

 M54.6  Pain in thoracic spine

 

 M79.12  Myalgia of auxiliary muscles, head and neck

 

 Z79.891  Long term (current) use of opiate analgesic









 Office Visit  2018  2:15p  Main Office as  Yair Calderon  G89.29  Other 
chronic



     Of 14  , MPH    pain









 M54.2  Cervicalgia

 

 M99.01  Segmental and somatic dysfunction of cervical region

 

 M54.6  Pain in thoracic spine

 

 M99.02  Segmental and somatic dysfunction of thoracic region

 

 M79.12  Myalgia of auxiliary muscles, head and neck

 

 Z79.891  Long term (current) use of opiate analgesic









 Office Visit  10/15/2018 10:30a  Main Office as  Yair Calderon,  G89.29  Other 
chronic



     Of 14  DO, MPH    pain









 M54.2  Cervicalgia

 

 M99.01  Segmental and somatic dysfunction of cervical region

 

 M54.6  Pain in thoracic spine

 

 M99.02  Segmental and somatic dysfunction of thoracic region

 

 M25.512  Pain in left shoulder

 

 M99.07  Segmental and somatic dysfunction of upper extremity

 

 M65.88  Other synovitis and tenosynovitis, other site

 

 M79.12  Myalgia of auxiliary muscles, head and neck

 

 Z79.891  Long term (current) use of opiate analgesic









 Office Visit  2018  3:15p  Main Office as  Yair Calderon,  G89.29  Other 
chronic



     Of 14  DO, MPH    pain









 M79.7  Fibromyalgia

 

 M54.5  Low back pain

 

 M99.03  Segmental and somatic dysfunction of lumbar region

 

 M54.6  Pain in thoracic spine

 

 M99.02  Segmental and somatic dysfunction of thoracic region

 

 M54.2  Cervicalgia

 

 M99.01  Segmental and somatic dysfunction of cervical region

 

 M53.3  Sacrococcygeal disorders, not elsewhere classified

 

 M99.04  Segmental and somatic dysfunction of sacral region

 

 R53.83  Other fatigue

 

 Z79.891  Long term (current) use of opiate analgesic

 

 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









 Office Visit  2018  2:30p  Main Office as  Yair Calderon,  G89.29  Other 
chronic



     Of 14  DO, MPH    pain









 M79.7  Fibromyalgia

 

 M54.2  Cervicalgia

 

 M99.01  Segmental and somatic dysfunction of cervical region

 

 M54.6  Pain in thoracic spine

 

 M99.02  Segmental and somatic dysfunction of thoracic region

 

 M54.5  Low back pain

 

 M99.03  Segmental and somatic dysfunction of lumbar region

 

 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

 

 R53.83  Other fatigue

 

 Z79.891  Long term (current) use of opiate analgesic









 Office Visit  2018  2:15p  Main Office as  Yair Calderon,  G89.29  Other 
chronic



     Of 14  DO, MPH    pain









 M54.2  Cervicalgia

 

 M99.01  Segmental and somatic dysfunction of cervical region

 

 M54.6  Pain in thoracic spine

 

 M99.02  Segmental and somatic dysfunction of thoracic region

 

 M54.5  Low back pain

 

 M99.03  Segmental and somatic dysfunction of lumbar region

 

 M79.7  Fibromyalgia

 

 Z79.891  Long term (current) use of opiate analgesic









 Office Visit  2018  3:00p  Main Office as  Yair Calderon,  G89.29  Other 
chronic



     Of 14  DO, MPH    pain









 M54.6  Pain in thoracic spine

 

 M54.5  Low back pain

 

 M54.2  Cervicalgia

 

 M65.88  Other synovitis and tenosynovitis, other site

 

 M79.1  Myalgia

 

 Z79.891  Long term (current) use of opiate analgesic









 Office Visit  05/15/2018  2:15p  Main Office as  Yair Calderon  G89.29  Other 
chronic



     Of 14  DO, MPH    pain









 M79.7  Fibromyalgia

 

 M54.6  Pain in thoracic spine

 

 M99.02  Segmental and somatic dysfunction of thoracic region

 

 M54.5  Low back pain

 

 M99.03  Segmental and somatic dysfunction of lumbar region

 

 M54.2  Cervicalgia

 

 M99.01  Segmental and somatic dysfunction of cervical region

 

 R10.2  Pelvic and perineal pain

 

 M99.05  Segmental and somatic dysfunction of pelvic region

 

 Z79.891  Long term (current) use of opiate analgesic









 Office Visit  2018  2:30p  Main Office as  Yair Calderon,  G89.29  Other 
chronic



     Of 14  DO, MPH    pain









 M79.7  Fibromyalgia

 

 M54.2  Cervicalgia

 

 M99.01  Segmental and somatic dysfunction of cervical region

 

 M54.6  Pain in thoracic spine

 

 M99.02  Segmental and somatic dysfunction of thoracic region

 

 M54.5  Low back pain

 

 M99.03  Segmental and somatic dysfunction of lumbar region

 

 Z79.891  Long term (current) use of opiate analgesic









 Office Visit  03/15/2018  2:00p  Main Office as  Yair Calderon  G89.29  Other 
chronic



     Of 14  DO, MPH    pain









 M79.7  Fibromyalgia

 

 M54.2  Cervicalgia

 

 M99.01  Segmental and somatic dysfunction of cervical region

 

 M54.5  Low back pain

 

 M99.03  Segmental and somatic dysfunction of lumbar region

 

 M54.6  Pain in thoracic spine

 

 M99.02  Segmental and somatic dysfunction of thoracic region

 

 Z79.891  Long term (current) use of opiate analgesic









 Office Visit  2018 11:15a  Main Office as  Yair Calderon,  G89.29  Other 
chronic



     Of 14  DO, MPH    pain









 M79.7  Fibromyalgia

 

 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

 

 M54.2  Cervicalgia

 

 M99.01  Segmental and somatic dysfunction of cervical region

 

 M54.6  Pain in thoracic spine

 

 M99.02  Segmental and somatic dysfunction of thoracic region

 

 M54.5  Low back pain

 

 M99.03  Segmental and somatic dysfunction of lumbar region

 

 M53.3  Sacrococcygeal disorders, not elsewhere classified

 

 M99.04  Segmental and somatic dysfunction of sacral region

 

 Z79.891  Long term (current) use of opiate analgesic

 

 M25.551  Pain in right hip

 

 M99.05  Segmental and somatic dysfunction of pelvic region









 Office Visit  2018 11:45a  Main Office as  Yair Calderon  G89.29  Other 
chronic



     Of 14  DO, MPH    pain









 M54.2  Cervicalgia

 

 M99.01  Segmental and somatic dysfunction of cervical region

 

 M54.6  Pain in thoracic spine

 

 M99.02  Segmental and somatic dysfunction of thoracic region

 

 M54.5  Low back pain

 

 M99.03  Segmental and somatic dysfunction of lumbar region

 

 M53.3  Sacrococcygeal disorders, not elsewhere classified

 

 M25.511  Pain in right shoulder

 

 M79.1  Myalgia

 

 Z79.891  Long term (current) use of opiate analgesic

 

 M54.16  Radiculopathy, lumbar region

 

 M99.04  Segmental and somatic dysfunction of sacral region

 

 M99.07  Segmental and somatic dysfunction of upper extremity









 Office Visit  2017 11:45a  Main Office as  Yair Calderon  G89.29  Other 
chronic



     Of 14  DO, MPH    pain









 M54.6  Pain in thoracic spine

 

 M99.02  Segmental and somatic dysfunction of thoracic region

 

 M54.5  Low back pain

 

 M99.03  Segmental and somatic dysfunction of lumbar region

 

 M54.2  Cervicalgia

 

 M99.01  Segmental and somatic dysfunction of cervical region

 

 M99.04  Segmental and somatic dysfunction of sacral region

 

 Z79.891  Long term (current) use of opiate analgesic









 Office Visit  11/10/2017  3:00p  Main Office as  Yair Calderon,  G89.29  Other 
chronic



     Of 14  DO, MPH    pain









 M54.2  Cervicalgia

 

 M99.01  Segmental and somatic dysfunction of cervical region

 

 M54.6  Pain in thoracic spine

 

 M99.02  Segmental and somatic dysfunction of thoracic region

 

 M54.5  Low back pain

 

 M99.03  Segmental and somatic dysfunction of lumbar region

 

 M25.511  Pain in right shoulder

 

 M99.07  Segmental and somatic dysfunction of upper extremity

 

 M79.1  Myalgia

 

 M53.3  Sacrococcygeal disorders, not elsewhere classified

 

 Z79.891  Long term (current) use of opiate analgesic

 

 M99.04  Segmental and somatic dysfunction of sacral region









 Office Visit  10/10/2017  2:30p  Main Office as  Yair Calderon,  G89.29  Other 
chronic



     Of 14  DO, MPH    pain









 M99.01  Segmental and somatic dysfunction of cervical region

 

 G90.3  Multi-system degeneration of the autonomic nervous system

 

 M54.2  Cervicalgia

 

 M54.6  Pain in thoracic spine

 

 M99.02  Segmental and somatic dysfunction of thoracic region

 

 M54.5  Low back pain

 

 M99.03  Segmental and somatic dysfunction of lumbar region

 

 M65.88  Other synovitis and tenosynovitis, other site

 

 M25.511  Pain in right shoulder

 

 M79.1  Myalgia

 

 Z79.891  Long term (current) use of opiate analgesic

 

 M25.512  Pain in left shoulder

 

 M99.07  Segmental and somatic dysfunction of upper extremity

 

 M53.3  Sacrococcygeal disorders, not elsewhere classified

 

 M99.04  Segmental and somatic dysfunction of sacral region









 Office Visit  2017  2:15p  Main Office as  Yair Calderon,  G89.29  Other 
chronic



     Of 14  DO, MPH    pain









 M79.7  Fibromyalgia

 

 M25.511  Pain in right shoulder

 

 M25.512  Pain in left shoulder

 

 M99.07  Segmental and somatic dysfunction of upper extremity

 

 M54.2  Cervicalgia

 

 M99.01  Segmental and somatic dysfunction of cervical region

 

 M99.02  Segmental and somatic dysfunction of thoracic region

 

 M54.5  Low back pain

 

 Z79.891  Long term (current) use of opiate analgesic

 

 R53.83  Other fatigue

 

 G90.3  Multi-system degeneration of the autonomic nervous system

 

 M54.6  Pain in thoracic spine

 

 M99.03  Segmental and somatic dysfunction of lumbar region

 

 M53.3  Sacrococcygeal disorders, not elsewhere classified

 

 M99.04  Segmental and somatic dysfunction of sacral region









 Office Visit  08/10/2017  3:00p  Main Office as  Yair Calderon  G89.29  Other 
chronic



     Of 14  DO, MPH    pain









 M79.7  Fibromyalgia

 

 M25.511  Pain in right shoulder

 

 M25.512  Pain in left shoulder

 

 M54.2  Cervicalgia

 

 M99.01  Segmental and somatic dysfunction of cervical region

 

 M54.6  Pain in thoracic spine

 

 M99.02  Segmental and somatic dysfunction of thoracic region

 

 M54.5  Low back pain

 

 M99.03  Segmental and somatic dysfunction of lumbar region

 

 Z79.891  Long term (current) use of opiate analgesic









 Office Visit  2017  2:30p  Main Office as  Renetta Pink  G89.29  
Other chronic



     Of 14  FNP    pain









 M79.7  Fibromyalgia

 

 M25.511  Pain in right shoulder

 

 M25.512  Pain in left shoulder

 

 M54.2  Cervicalgia

 

 M99.01  Segmental and somatic dysfunction of cervical region

 

 M54.6  Pain in thoracic spine

 

 M99.02  Segmental and somatic dysfunction of thoracic region

 

 Z71.89  Other specified counseling

 

 M54.5  Low back pain

 

 M99.03  Segmental and somatic dysfunction of lumbar region

 

 Z79.891  Long term (current) use of opiate analgesic









 Office Visit  2017  1:00p  Main Office as  Yair Calderon  G89.29  Other 
chronic



     Of 14  DO, MPH    pain









 M79.7  Fibromyalgia

 

 M54.2  Cervicalgia

 

 M99.01  Segmental and somatic dysfunction of cervical region

 

 M25.512  Pain in left shoulder

 

 M25.511  Pain in right shoulder

 

 M99.07  Segmental and somatic dysfunction of upper extremity

 

 M54.6  Pain in thoracic spine

 

 M99.02  Segmental and somatic dysfunction of thoracic region

 

 Z79.891  Long term (current) use of opiate analgesic









 Office Visit  2017  2:45p  Main Office as  Yair Calderon  G89.29  Other 
chronic



     Of 14  DO, MPH    pain









 M79.7  Fibromyalgia

 

 M25.512  Pain in left shoulder

 

 M25.511  Pain in right shoulder

 

 M99.07  Segmental and somatic dysfunction of upper extremity

 

 M54.2  Cervicalgia

 

 M99.01  Segmental and somatic dysfunction of cervical region

 

 M54.6  Pain in thoracic spine

 

 M99.02  Segmental and somatic dysfunction of thoracic region

 

 M65.88  Other synovitis and tenosynovitis, other site

 

 Z79.891  Long term (current) use of opiate analgesic









 Office Visit  2017 11:30a  Main Office as  Yair Calderon  G89.29  Other 
chronic



     Of 14  DO, MPH    pain









 M79.7  Fibromyalgia

 

 M25.512  Pain in left shoulder

 

 M25.511  Pain in right shoulder

 

 M99.07  Segmental and somatic dysfunction of upper extremity

 

 M54.2  Cervicalgia

 

 M99.01  Segmental and somatic dysfunction of cervical region

 

 M54.6  Pain in thoracic spine

 

 M99.02  Segmental and somatic dysfunction of thoracic region

 

 Z79.891  Long term (current) use of opiate analgesic









 Office Visit  2017  4:00p  Main Office as  Yair Calderon  G89.29  Other 
chronic



     Of 14  DO, MPH    pain









 M79.7  Fibromyalgia

 

 M25.512  Pain in left shoulder

 

 M25.511  Pain in right shoulder

 

 M99.07  Segmental and somatic dysfunction of upper extremity

 

 M54.2  Cervicalgia

 

 M99.01  Segmental and somatic dysfunction of cervical region

 

 M54.6  Pain in thoracic spine

 

 M99.02  Segmental and somatic dysfunction of thoracic region

 

 Z79.891  Long term (current) use of opiate analgesic









 Office Visit  02/10/2017  2:15p  Main Office as  Renetta Pink  G89.29  
Other chronic



     Of 14  FNP    pain









 M79.7  Fibromyalgia

 

 M25.512  Pain in left shoulder

 

 M25.511  Pain in right shoulder

 

 M54.6  Pain in thoracic spine

 

 M54.2  Cervicalgia

 

 Z79.891  Long term (current) use of opiate analgesic









 Office Visit  2017 11:15a  Main Office as  Yair Calderon  G89.29  Other 
chronic



     Of 14  DO, MPH    pain









 M79.7  Fibromyalgia

 

 M54.2  Cervicalgia

 

 M99.01  Segmental and somatic dysfunction of cervical region

 

 M54.6  Pain in thoracic spine

 

 M99.02  Segmental and somatic dysfunction of thoracic region

 

 M25.512  Pain in left shoulder

 

 M25.511  Pain in right shoulder

 

 M99.07  Segmental and somatic dysfunction of upper extremity









 Office Visit  2016 11:15a  Main Office as  Yair Calderon  G89.29  Other 
chronic



     Of 14  DO, MPH    pain









 M79.7  Fibromyalgia

 

 M54.2  Cervicalgia

 

 M54.12  Radiculopathy, cervical region

 

 M25.512  Pain in left shoulder

 

 M65.88  Other synovitis and tenosynovitis, other site

 

 M79.1  Myalgia

 

 M46.07  Spinal enthesopathy, lumbosacral region

 

 M99.01  Segmental and somatic dysfunction of cervical region

 

 M54.6  Pain in thoracic spine

 

 M99.02  Segmental and somatic dysfunction of thoracic region

 

 M99.07  Segmental and somatic dysfunction of upper extremity

 

 G90.3  Multi-system degeneration of the autonomic nervous system









 Office Visit  2016  1:45p  Main Office as  Yair Calderon,  G89.29  Other 
chronic



     Of 14  DO, MPH    pain









 M54.2  Cervicalgia

 

 M25.512  Pain in left shoulder

 

 M65.88  Other synovitis and tenosynovitis, other site

 

 M79.1  Myalgia

 

 M46.07  Spinal enthesopathy, lumbosacral region

 

 M54.12  Radiculopathy, cervical region









 Office Visit  2016 11:15a  Main Office as  Yair Calderon,  G89.29  Other 
chronic



     Of 14  DO, MPH    pain









 M79.7  Fibromyalgia

 

 M54.2  Cervicalgia

 

 M99.01  Segmental and somatic dysfunction of cervical region

 

 M54.6  Pain in thoracic spine

 

 M99.02  Segmental and somatic dysfunction of thoracic region

 

 M54.5  Low back pain

 

 M99.03  Segmental and somatic dysfunction of lumbar region

 

 Z79.891  Long term (current) use of opiate analgesic

 

 K59.09  Other constipation

 

 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

 

 G90.3  Multi-system degeneration of the autonomic nervous system









 Office Visit  10/07/2016  1:30p  Main Office as  Yair Calderon,  G89.29  Other 
chronic



     Of 14  DO, MPH    pain









 M79.7  Fibromyalgia

 

 M54.2  Cervicalgia

 

 M99.01  Segmental and somatic dysfunction of cervical region

 

 M54.6  Pain in thoracic spine

 

 M99.02  Segmental and somatic dysfunction of thoracic region

 

 M54.5  Low back pain

 

 M99.03  Segmental and somatic dysfunction of lumbar region

 

 Z79.891  Long term (current) use of opiate analgesic









 Office Visit  2016  1:15p  Main Office as  Yair Calderon,  G89.29  Other 
chronic



     Of 14  DO, MPH    pain









 M79.7  Fibromyalgia

 

 M54.5  Low back pain

 

 M99.03  Segmental and somatic dysfunction of lumbar region

 

 M54.6  Pain in thoracic spine

 

 M99.02  Segmental and somatic dysfunction of thoracic region

 

 M54.2  Cervicalgia

 

 M99.01  Segmental and somatic dysfunction of cervical region









 Office Visit  2016  1:15p  Main Office as  Yair Calderon,  G89.29  Other 
chronic



     Of 14  DO, MPH    pain









 M79.7  Fibromyalgia

 

 M54.5  Low back pain

 

 M99.03  Segmental and somatic dysfunction of lumbar region

 

 M54.6  Pain in thoracic spine

 

 M99.02  Segmental and somatic dysfunction of thoracic region

 

 M54.2  Cervicalgia

 

 M99.01  Segmental and somatic dysfunction of cervical region

 

 Z79.891  Long term (current) use of opiate analgesic









 Office Visit  08/10/2016 10:15a  Main Office as  Yair Calderon  G89.29  Other 
chronic



     Of 14  DO, MPH    pain









 M79.7  Fibromyalgia

 

 M54.5  Low back pain

 

 M99.03  Segmental and somatic dysfunction of lumbar region

 

 M54.6  Pain in thoracic spine

 

 M99.02  Segmental and somatic dysfunction of thoracic region

 

 M54.2  Cervicalgia

 

 M99.01  Segmental and somatic dysfunction of cervical region

 

 Z79.891  Long term (current) use of opiate analgesic









 Office Visit  2016  1:15p  Main Office as Of  Yair Calderon, DO,  M79.7  
Fibromyalgia



     14  MPH    









 M54.2  Cervicalgia

 

 M99.01  Segmental and somatic dysfunction of cervical region

 

 M54.6  Pain in thoracic spine

 

 M99.02  Segmental and somatic dysfunction of thoracic region

 

 M54.5  Low back pain

 

 M99.03  Segmental and somatic dysfunction of lumbar region

 

 K59.09  Other constipation









 Office Visit  2016 11:30a  Main Office as  Yair Calderon  G89.29  Other 
chronic



     Of 14  DO, MPH    pain









 M79.7  Fibromyalgia

 

 M54.2  Cervicalgia

 

 M99.01  Segmental and somatic dysfunction of cervical region

 

 M54.6  Pain in thoracic spine

 

 M99.02  Segmental and somatic dysfunction of thoracic region

 

 M54.5  Low back pain

 

 M99.03  Segmental and somatic dysfunction of lumbar region

 

 Z79.891  Long term (current) use of opiate analgesic









 Office Visit  2016 11:30a  Main Office as  Yair Calderon,  G89.29  Other 
chronic



     Of 14  DO, MPH    pain









 M79.7  Fibromyalgia

 

 M54.2  Cervicalgia

 

 M99.01  Segmental and somatic dysfunction of cervical region

 

 M54.6  Pain in thoracic spine

 

 M99.02  Segmental and somatic dysfunction of thoracic region

 

 M54.5  Low back pain

 

 M99.03  Segmental and somatic dysfunction of lumbar region

 

 M65.88  Other synovitis and tenosynovitis, other site









 Office Visit  2016 11:45a  Main Office as  Yair Calderon,  G89.29  Other 
chronic



     Of 14  DO, MPH    pain









 M79.7  Fibromyalgia

 

 M54.2  Cervicalgia

 

 M99.01  Segmental and somatic dysfunction of cervical region

 

 M54.6  Pain in thoracic spine

 

 M99.02  Segmental and somatic dysfunction of thoracic region

 

 M54.5  Low back pain

 

 M99.03  Segmental and somatic dysfunction of lumbar region

 

 Z79.891  Long term (current) use of opiate analgesic









 Office Visit  2016 11:00a  Main Office as  Yair Calderon  G89.29  Other 
chronic



     Of 14  DO, MPH    pain









 M79.7  Fibromyalgia

 

 M54.2  Cervicalgia

 

 M99.01  Segmental and somatic dysfunction of cervical region

 

 M54.6  Pain in thoracic spine

 

 M99.02  Segmental and somatic dysfunction of thoracic region

 

 M54.5  Low back pain

 

 M99.03  Segmental and somatic dysfunction of lumbar region

 

 Z79.891  Long term (current) use of opiate analgesic









 Office Visit  2016 11:30a  Main Office as  Yair Calderon  Z79.891  Long 
term



     Of 14  DO, MPH    (current) use of



           opiate analgesic









 G89.29  Other chronic pain

 

 M79.7  Fibromyalgia

 

 M54.6  Pain in thoracic spine

 

 M99.02  Segmental and somatic dysfunction of thoracic region

 

 M54.2  Cervicalgia

 

 M99.01  Segmental and somatic dysfunction of cervical region

 

 M54.5  Low back pain

 

 M99.03  Segmental and somatic dysfunction of lumbar region

 

 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

 

 R53.83  Other fatigue

 

 Z79.891  Long term (current) use of opiate analgesic









 Office Visit  2016  1:15p  Main Office as  Yair Calderon  Z79.891  Long 
term



     Of 14  DO, MPH    (current) use of



           opiate analgesic









 G89.29  Other chronic pain

 

 M79.7  Fibromyalgia

 

 M54.6  Pain in thoracic spine

 

 M99.02  Segmental and somatic dysfunction of thoracic region

 

 M54.2  Cervicalgia

 

 M99.01  Segmental and somatic dysfunction of cervical region

 

 M54.5  Low back pain

 

 M99.03  Segmental and somatic dysfunction of lumbar region

 

 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

 

 Z79.891  Long term (current) use of opiate analgesic

 

 R53.83  Other fatigue









 Office Visit  2016 10:00a  Main Office as  Yair Calderon,  G89.29  Other 
chronic



     Of 14  DO, MPH    pain









 M79.7  Fibromyalgia

 

 M54.6  Pain in thoracic spine

 

 M99.02  Segmental and somatic dysfunction of thoracic region

 

 M54.2  Cervicalgia

 

 M99.01  Segmental and somatic dysfunction of cervical region

 

 M54.5  Low back pain

 

 M99.03  Segmental and somatic dysfunction of lumbar region

 

 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









 Office Visit  2016 10:00a  Main Office as  Yair Calderon,  G89.29  Other 
chronic



     Of 14  DO, MPH    pain









 M79.7  Fibromyalgia

 

 M54.6  Pain in thoracic spine

 

 M99.02  Segmental and somatic dysfunction of thoracic region

 

 M54.2  Cervicalgia

 

 M99.01  Segmental and somatic dysfunction of cervical region

 

 M54.5  Low back pain

 

 M99.03  Segmental and somatic dysfunction of lumbar region









 Office Visit  2016  2:30p  Main Office as  Yair Calderon,  G89.29  Other 
chronic



     Of 14  DO, MPH    pain









 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

 

 M79.7  Fibromyalgia

 

 M54.6  Pain in thoracic spine

 

 M54.5  Low back pain









 Office Visit  2015  3:00p  Main Office as  Yair Calderon,  G89.29  Other 
chronic



     Of 14  DO, MPH    pain









 M53.82  Other specified dorsopathies, cervical region

 

 M54.6  Pain in thoracic spine

 

 M79.7  Fibromyalgia

 

 L94.8  Other specified localized connective tissue disorders









 Office Visit  2015  1:30p  Main Office as  Yair Calderon,  Z79.891  Long 
term



     Of 14  DO, MPH    (current) use of



           opiate analgesic









 G89.29  Other chronic pain

 

 M53.82  Other specified dorsopathies, cervical region

 

 M54.6  Pain in thoracic spine

 

 M65.862  Other synovitis and tenosynovitis, left lower leg

 

 M75.81  Other shoulder lesions, right shoulder

 

 M65.861  Other synovitis and tenosynovitis, right lower leg

 

 M79.1  Myalgia

 

 Z79.891  Long term (current) use of opiate analgesic









 Office Visit  10/23/2015  1:45p  Main Office as  Yair Calderon,  G89.29  Other 
chronic



     Of 14  DO, MPH    pain









 M53.82  Other specified dorsopathies, cervical region

 

 M99.01  Segmental and somatic dysfunction of cervical region

 

 M54.6  Pain in thoracic spine

 

 M99.02  Segmental and somatic dysfunction of thoracic region

 

 M75.81  Other shoulder lesions, right shoulder

 

 M99.07  Segmental and somatic dysfunction of upper extremity

 

 M79.1  Myalgia

 

 R53.83  Other fatigue









 Office Visit  2015  1:30p  Main Office as  Renetta Pink,  338.29  
Other Chronic



     Of 14  FNP    Pain









 723.9  Cervical Region Musculoskeletal Disorders & Symptoms Unspec

 

 723.4  Brachial,Cervical Or Suprascapular Neuritis

 

 726.19  Shoulder Disorders Other

 

 724.1  Pain Thoracic Spine

 

 739.1  Lesion Nonallopathic Cervical Region Not Elsewhere Class

 

 739.2  Lesion Nonallopathic Thoracic Region Not Elsewhere Class

 

 739.7  Lesion Nonallopathic Upper Extremity Not Elsewhere Class









 Office Visit  2015  9:30a  Main Office as  Yair Calderon,  338.29  Other 
Chronic



     Of 14  DO, MPH    Pain









 726.19  Shoulder Disorders Other

 

 723.4  Brachial,Cervical Or Suprascapular Neuritis

 

 723.9  Cervical Region Musculoskeletal Disorders & Symptoms Unspec

 

 739.1  Lesion Nonallopathic Cervical Region Not Elsewhere Class

 

 729.1  Myalgia & Myositis Unspec

 

 727.00  Synovitis & Tenosynovitis Unspec

 

 724.1  Pain Thoracic Spine

 

 739.2  Lesion Nonallopathic Thoracic Region Not Elsewhere Class

 

 739.7  Lesion Nonallopathic Upper Extremity Not Elsewhere Class









 Office Visit  2015 10:15a  Main Office as  Yair Calderon,  338.29  Other 
Chronic



     Of 14  DO, MPH    Pain









 723.9  Cervical Region Musculoskeletal Disorders & Symptoms Unspec

 

 724.1  Pain Thoracic Spine

 

 726.19  Shoulder Disorders Other

 

 724.2  Lumbago

 

 729.1  Myalgia & Myositis Unspec









 Office Visit  2015  4:00p  Main Office as  Yair Calderon,  338.29  Other 
Chronic



     Of 14  DO, MPH    Pain









 723.9  Cervical Region Musculoskeletal Disorders & Symptoms Unspec

 

 739.1  Lesion Nonallopathic Cervical Region Not Elsewhere Class

 

 724.1  Pain Thoracic Spine

 

 739.2  Lesion Nonallopathic Thoracic Region Not Elsewhere Class

 

 726.19  Shoulder Disorders Other

 

 739.7  Lesion Nonallopathic Upper Extremity Not Elsewhere Class

 

 724.2  Lumbago

 

 739.3  Lesion Nonallopathic Lumbar Region Not Elsewhere Class

 

 337.9  Autonomic Nervous System Disorder Unspec

 

 729.1  Myalgia & Myositis Unspec









 Office Visit  2015  3:30p  Main Office as  Yair Calderon,  338.29  Other 
Chronic



     Of 14  DO, MPH    Pain









 723.9  Cervical Region Musculoskeletal Disorders & Symptoms Unspec

 

 739.1  Lesion Nonallopathic Cervical Region Not Elsewhere Class

 

 724.1  Pain Thoracic Spine

 

 739.2  Lesion Nonallopathic Thoracic Region Not Elsewhere Class

 

 724.2  Lumbago

 

 739.3  Lesion Nonallopathic Lumbar Region Not Elsewhere Class

 

 726.19  Shoulder Disorders Other

 

 739.7  Lesion Nonallopathic Upper Extremity Not Elsewhere Class

 

 V58.69  Medications Long Term (Current) Use Encounter









 Office Visit  2015  4:00p  Main Office as  Yair Calderon,  338.29  Other 
Chronic



     Of 14  DO, MPH    Pain









 723.9  Cervical Region Musculoskeletal Disorders & Symptoms Unspec

 

 739.1  Lesion Nonallopathic Cervical Region Not Elsewhere Class

 

 724.1  Pain Thoracic Spine

 

 739.2  Lesion Nonallopathic Thoracic Region Not Elsewhere Class

 

 724.2  Lumbago

 

 739.3  Lesion Nonallopathic Lumbar Region Not Elsewhere Class

 

 726.19  Shoulder Disorders Other

 

 739.7  Lesion Nonallopathic Upper Extremity Not Elsewhere Class









 Office Visit  2015  4:00p  Main Office as  Renetta Pink,  338.29  
Other Chronic



     Of 14  FNP    Pain









 729.1  Myalgia & Myositis Unspec

 

 728.85  Spasm Muscle

 

 723.9  Cervical Region Musculoskeletal Disorders & Symptoms Unspec

 

 739.1  Lesion Nonallopathic Cervical Region Not Elsewhere Class

 

 724.1  Pain Thoracic Spine

 

 739.2  Lesion Nonallopathic Thoracic Region Not Elsewhere Class

 

 724.2  Lumbago

 

 739.3  Lesion Nonallopathic Lumbar Region Not Elsewhere Class

 

 726.19  Shoulder Disorders Other

 

 739.7  Lesion Nonallopathic Upper Extremity Not Elsewhere Class









 Office Visit  2015  3:45p  Main Office as  Yair Calderon,  338.29  Other 
Chronic



     Of 14  DO, MPH    Pain









 723.9  Cervical Region Musculoskeletal Disorders & Symptoms Unspec

 

 739.1  Lesion Nonallopathic Cervical Region Not Elsewhere Class

 

 724.1  Pain Thoracic Spine

 

 739.2  Lesion Nonallopathic Thoracic Region Not Elsewhere Class

 

 724.2  Lumbago

 

 739.3  Lesion Nonallopathic Lumbar Region Not Elsewhere Class

 

 727.00  Synovitis & Tenosynovitis Unspec

 

 729.1  Myalgia & Myositis Unspec

 

 723.4  Brachial,Cervical Or Suprascapular Neuritis

 

 739.7  Lesion Nonallopathic Upper Extremity Not Elsewhere Class









 Office Visit  2015  3:45p  Main Office as  Yair Calderon,  338.29  Other 
Chronic



     Of 14  DO, MPH    Pain









 723.9  Cervical Region Musculoskeletal Disorders & Symptoms Unspec

 

 739.1  Lesion Nonallopathic Cervical Region Not Elsewhere Class

 

 724.1  Pain Thoracic Spine

 

 739.2  Lesion Nonallopathic Thoracic Region Not Elsewhere Class

 

 724.2  Lumbago

 

 739.3  Lesion Nonallopathic Lumbar Region Not Elsewhere Class

 

 726.19  Shoulder Disorders Other

 

 739.7  Lesion Nonallopathic Upper Extremity Not Elsewhere Class









 Office Visit  2015  4:00p  Main Office as  Yair Calderon,  338.29  Other 
Chronic



     Of 14  DO, MPH    Pain









 723.9  Cervical Region Musculoskeletal Disorders & Symptoms Unspec

 

 729.1  Myalgia & Myositis Unspec

 

 727.00  Synovitis & Tenosynovitis Unspec

 

 724.1  Pain Thoracic Spine

 

 724.2  Lumbago

 

 V58.69  Medications Long Term (Current) Use Encounter

 

 739.1  Lesion Nonallopathic Cervical Region Not Elsewhere Class

 

 739.2  Lesion Nonallopathic Thoracic Region Not Elsewhere Class









 Office Visit  2015  4:00p  Main Office as  Yair Calderon,  338.29  Other 
Chronic



     Of 14  DO, MPH    Pain









 723.9  Cervical Region Musculoskeletal Disorders & Symptoms Unspec

 

 739.1  Lesion Nonallopathic Cervical Region Not Elsewhere Class

 

 729.1  Myalgia & Myositis Unspec

 

 727.00  Synovitis & Tenosynovitis Unspec

 

 723.4  Brachial,Cervical Or Suprascapular Neuritis

 

 724.1  Pain Thoracic Spine

 

 739.2  Lesion Nonallopathic Thoracic Region Not Elsewhere Class

 

 724.2  Lumbago

 

 739.3  Lesion Nonallopathic Lumbar Region Not Elsewhere Class

 

 726.19  Shoulder Disorders Other

 

 739.7  Lesion Nonallopathic Upper Extremity Not Elsewhere Class









 Office Visit  2015 10:30a  Main Office as  Yair Calderon,  338.29  Other 
Chronic



     Of 14  DO, MPH    Pain









 723.9  Cervical Region Musculoskeletal Disorders & Symptoms Unspec

 

 739.1  Lesion Nonallopathic Cervical Region Not Elsewhere Class

 

 724.1  Pain Thoracic Spine

 

 739.2  Lesion Nonallopathic Thoracic Region Not Elsewhere Class

 

 724.2  Lumbago

 

 739.3  Lesion Nonallopathic Lumbar Region Not Elsewhere Class

 

 726.19  Shoulder Disorders Other

 

 739.7  Lesion Nonallopathic Upper Extremity Not Elsewhere Class

 

 729.1  Myalgia & Myositis Unspec









 Office Visit  2015  2:45p  Main Office as  Yair Calderon,  338.29  Other 
Chronic



     Of 14  DO, MPH    Pain









 723.9  Cervical Region Musculoskeletal Disorders & Symptoms Unspec

 

 739.1  Lesion Nonallopathic Cervical Region Not Elsewhere Class

 

 724.1  Pain Thoracic Spine

 

 739.2  Lesion Nonallopathic Thoracic Region Not Elsewhere Class

 

 724.2  Lumbago

 

 739.3  Lesion Nonallopathic Lumbar Region Not Elsewhere Class

 

 727.09  Synovitis & Tenosynovitis Other









 Office Visit  2015  9:30a  Main Office as  Yair Cadleron,  338.29  Other 
Chronic



     Of 14  DO, MPH    Pain









 723.9  Cervical Region Musculoskeletal Disorders & Symptoms Unspec

 

 739.1  Lesion Nonallopathic Cervical Region Not Elsewhere Class

 

 724.1  Pain Thoracic Spine

 

 739.2  Lesion Nonallopathic Thoracic Region Not Elsewhere Class

 

 726.19  Shoulder Disorders Other

 

 739.7  Lesion Nonallopathic Upper Extremity Not Elsewhere Class

 

 729.1  Myalgia & Myositis Unspec

 

 727.00  Synovitis & Tenosynovitis Unspec

 

 723.4  Brachial,Cervical Or Suprascapular Neuritis

 

 724.2  Lumbago

 

 739.3  Lesion Nonallopathic Lumbar Region Not Elsewhere Class









 Office Visit  2014  2:15p  Main Office as  Yair Calderon,  338.29  Other 
Chronic



     Of 14  DO, MPH    Pain









 723.9  Cervical Region Musculoskeletal Disorders & Symptoms Unspec

 

 739.1  Lesion Nonallopathic Cervical Region Not Elsewhere Class

 

 724.1  Pain Thoracic Spine

 

 739.2  Lesion Nonallopathic Thoracic Region Not Elsewhere Class

 

 726.19  Shoulder Disorders Other

 

 739.7  Lesion Nonallopathic Upper Extremity Not Elsewhere Class

 

 729.1  Myalgia & Myositis Unspec









 Office Visit  11/10/2014  2:30p  Main Office as  Yair Calderon,  338.29  Other 
Chronic



     Of 14  DO, MPH    Pain









 723.9  Cervical Region Musculoskeletal Disorders & Symptoms Unspec

 

 724.1  Pain Thoracic Spine

 

 726.19  Shoulder Disorders Other

 

 724.2  Lumbago

 

 V58.69  Medications Long Term (Current) Use Encounter

 

 727.09  Synovitis & Tenosynovitis Other

 

 739.1  Lesion Nonallopathic Cervical Region Not Elsewhere Class

 

 739.2  Lesion Nonallopathic Thoracic Region Not Elsewhere Class

 

 739.3  Lesion Nonallopathic Lumbar Region Not Elsewhere Class

 

 739.7  Lesion Nonallopathic Upper Extremity Not Elsewhere Class









 Office Visit  10/10/2014 11:30a  Main Office as  Yair Calderon,  338.29  Other 
Chronic



     Of 14  DO, MPH    Pain









 723.9  Cervical Region Musculoskeletal Disorders & Symptoms Unspec

 

 739.1  Lesion Nonallopathic Cervical Region Not Elsewhere Class

 

 724.1  Pain Thoracic Spine

 

 739.2  Lesion Nonallopathic Thoracic Region Not Elsewhere Class

 

 726.19  Shoulder Disorders Other

 

 739.7  Lesion Nonallopathic Upper Extremity Not Elsewhere Class

 

 724.2  Lumbago

 

 739.3  Lesion Nonallopathic Lumbar Region Not Elsewhere Class









 Office Visit  2014  9:45a  Main Office as  Yair Calderon,  338.29  Other 
Chronic



     Of 14  DO, MPH    Pain









 723.9  Cervical Region Musculoskeletal Disorders & Symptoms Unspec

 

 739.1  Lesion Nonallopathic Cervical Region Not Elsewhere Class

 

 724.1  Pain Thoracic Spine

 

 739.2  Lesion Nonallopathic Thoracic Region Not Elsewhere Class

 

 726.19  Shoulder Disorders Other

 

 739.7  Lesion Nonallopathic Upper Extremity Not Elsewhere Class

 

 727.09  Synovitis & Tenosynovitis Other

 

 729.1  Myalgia & Myositis Unspec

 

 723.4  Brachial,Cervical Or Suprascapular Neuritis









 Office Visit  2014  9:15a  Main Office as  Yair Calderon,  338.29  Other 
Chronic



     Of 14  DO, MPH    Pain









 729.1  Myalgia & Myositis Unspec

 

 719.49  Pain Joint Multiple Sites

 

 V65.42  Counseling On Substance Use & Abuse









 Office Visit  2014  3:30p  Main Office as  Yair Calderon,  338.29  Other 
Chronic



     Of 14  DO, MPH    Pain









 726.19  Shoulder Disorders Other

 

 727.00  Synovitis & Tenosynovitis Unspec

 

 723.4  Brachial,Cervical Or Suprascapular Neuritis

 

 723.1  Cervicalgia

 

 739.1  Lesion Nonallopathic Cervical Region Not Elsewhere Class

 

 724.1  Pain Thoracic Spine

 

 739.2  Lesion Nonallopathic Thoracic Region Not Elsewhere Class

 

 724.2  Lumbago

 

 739.3  Lesion Nonallopathic Lumbar Region Not Elsewhere Class

 

 739.7  Lesion Nonallopathic Upper Extremity Not Elsewhere Class

 

 729.1  Myalgia & Myositis Unspec









 Office Visit  2014 11:15a  Main Office as  Yair Calderon,  338.29  Other 
Chronic



     Of 14  DO, MPH    Pain









 729.1  Myalgia & Myositis Unspec

 

 723.1  Cervicalgia

 

 739.1  Lesion Nonallopathic Cervical Region Not Elsewhere Class

 

 724.1  Pain Thoracic Spine

 

 739.2  Lesion Nonallopathic Thoracic Region Not Elsewhere Class

 

 724.2  Lumbago

 

 739.3  Lesion Nonallopathic Lumbar Region Not Elsewhere Class

 

 724.6  Sacral Disorder

 

 739.4  Lesion Nonallopathic Sacral Region Not Elsewhere Class

 

 719.45  Pain Joint Pelvic Region & Thigh

 

 739.5  Lesion Nonallopathic Pelvic Region Not Elsewhere Class

 

 V58.69  Medications Long Term (Current) Use Encounter









 Office Visit  2014 11:00a  Main Office as  Yair Calderon,  338.29  Other 
Chronic



     Of 14  DO, MPH    Pain









 719.45  Pain Joint Pelvic Region & Thigh

 

 729.5  Pain In Limb

 

 723.9  Cervical Region Musculoskeletal Disorders & Symptoms Unspec

 

 V65.42  Counseling On Substance Use & Abuse









 Office Visit  2014 10:30a  Main Office as  Yair Calderon,  338.29  Other 
Chronic



     Of 14  DO, MPH    Pain









 719.45  Pain Joint Pelvic Region & Thigh

 

 729.5  Pain In Limb

 

 723.9  Cervical Region Musculoskeletal Disorders & Symptoms Unspec

 

 729.1  Myalgia & Myositis Unspec









 Office Visit  2014 10:15a  Main Office as  Yair Calderon,  338.29  Other 
Chronic



     Of 14  BRIGITTE CAMILO    Pain









 719.45  Pain Joint Pelvic Region & Thigh

 

 726.5  Enthesopathy Hip









 Office Visit  2014 10:45a  Main Office as  Yair Calderon,  338.29  Other 
Chronic



     Of 14  BRIGITTE CAMILO    Pain









 729.1  Myalgia & Myositis Unspec









 Office Visit  2014  9:00a  Main Office as  Yair Calderon,  338.29  Other 
Chronic



     Of 14  BRIGITTE CAMILO    Pain









 729.1  Myalgia & Myositis Unspec

 

 V58.69  Medications Long Term (Current) Use Encounter







Plan of Treatment

Future Appointment(s):2019  2:15 pm - Yair Calderon DO MPH at Main 
Office as Of  - Yair Calderon DO, MPHG89.29 Other chronic 
painComments:Chronic. Symptoms and complaints discussed and reviewed today. No 
significant changes in physical findings.  Continue current medical pain 
management.M54.2 CervicalgiaComments:Chronic. Symptoms and complaints discussed 
and reviewed today. No significant changes in physical findings.  Continue 
current medical pain management.   ~B_ ~b_M54.6 Pain in thoracic spineComments:
Chronic.Symptoms and complaints discussed and reviewed today. No significant 
changes in physical findings. Continue current medical pain management.M79.7 
FibromyalgiaComments:Chronic. Symptoms and complaints discussed and reviewed 
today. No significant changes in physical findings. Continue current medical 
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 Symptoms and 
complaints discussed and reviewed today. No significant changes in physical 
findings.  Continue current medical pain management.Z79.891 Long term (current) 
use of opiate analgesicNew Labs:Urine Drug Screen, Ordered: 19Comments:
Urine drug screen sample taken today to monitor opiate use and to monitor use 
of illicit substances.Will discuss results at next appointment.The following 
tests were ordered:6 AM, AMPH, DEEDEE, JOSEFA, BUP, CARIS, COCM, COT, ETG, FENT, 
MCSHSG, OPI, OXY, PCP, TAPEN,  XTSY, ZOLP.      ~I_A urine drug test (UDT) was 
ordered for this patient and collected on site today. Creatinine has been 
ordered as well for specimen validity, not for kidney function. Preliminary UDT 
results are not final and should not be used to determine patient care or plan 
of treatment.  Initially a qualitative immunoassay screen will be done. Any 
inconsistent or positive findings will be further tested with a more 
comprehensive quantitative confirmation LCMS study. It is part of the treatment 
process of prescribing controlled substances and is considered standard of care.
~i_Z71.89 Other specified counselingComments:Pre and post surgical pain 
management discussed at length.She clearly understands we will follow 
thesituation with her surgeon.AllComments:All above symptoms and complaints 
discussed as well as diagnoses reviewed.Continue trial of opioid pain 
management - note changes below; injection therapy, osteopathic manipulation (
OMT), PT / modalities, and consults as needed to manage chronic pain.Side 
effects discussed; anticipatory guidance given. Patient clearly understands and 
agrees with all medical treatments and suggestions. All medicines prescribed 
are adequate and appropriate for this patient's complaint of pain, medical 
history, physical, and personal goals.Goals of Treatment are to provide 
adequate and appropriate multidisciplinary medical pain management to increase/ 
maintain patient's quality of life and functionality while maintaining 
satisfactory side effect profile and minimizing long term end-organ damage. 
Activity as toleratedContinue with PCP
Continuity of Care Document (CCD)

 Created on:2019



Patient:Diane Gonzalez

Sex:Female

:1975

External Reference #:[id number]





Demographics







 Address  [address] [place] [address]

 

 Mobile Phone  [phone]

 

 Email Address  [email or web address]

 

 Preferred Language  en

 

 Marital Status  Not  or 

 

 [employer] Affiliation  Unknown

 

 Race  White

 

 Ethnic Group  Not  or 









Author







 Name  AngelitoAnnemarie hancock









Support







 Name  Relationship  Address  Phone

 

 Robby Peña  Unavailable  Unavailable  [phone]

 

 Aundrea Gonzalez  Unavailable  Unavailable  [phone]









Care Team Providers







 Name  Role  Phone

 

 Ceci Montano MD  Primary Care Physician  Unavailable









Payers







 Type  Date  Identification Numbers  Payment Provider  Subscriber

 

     Policy Number: [id number]  Medicare  Diane Gonzalez









 PayID: [id number]  [address]









 [place] [address]









   Effective: 2016  Policy Number:  [employer]  Diane Gonzalez



     [id number]    









 Expires: 2019  Group Name: [id number]  [address] [address]









 PayID: [id number]  [place] [address]









   Expires: 2016  Policy Number: [id number]  [facility]  Diane Gonzalez









 PayID: [id number]  [address] 









 [place] [address]









   Expires: 2019  Policy Number: [id number]  [facility]  Diane Gonzalez









 PayID: [id number]  [address] 









 [place] [address]









   Effective: 2011  Policy Number: [id number]  [employer] Ppo  Diane Gonzalez









 Expires: 2011  PayID: [id number]  [address] 









 [place] [address]









   Expires: 2019  PayID: [id number]  [facility]  Diane Gonzalez









 [address] 

 

 [place] [address]









   Effective: 2019  Policy Number: [id number]  Medicaid  Diane Gonzalez









 Group Name: 1 1  [address]

 

 PayID: [id number]  [place] [address]







Advance Directives







 Description

 

 No Information Available







Problems







 Date  Description  Provider  Status

 

 Onset: 2016  Disturbance in sleep behavior  Nicole Meneses MD  Active







Family History







 Date  Family Member(s)  Problem(s)  Comments

 

   General  Diabetes  

 

   General  Rheumatoid Arthritis  

 

   Father  Chronic Obstructive Pulmonary Disease (COPD)  







Social History







 Type  Date  Description  Comments

 

 Birth Sex    Unknown  

 

 Marital Status    Single  

 

 Occupation    Disabled  

 

 ETOH Use    Denies alcohol use  

 

 Tobacco Use  Start: Unknown  Patient is a current smoker,  



     smokes every day  

 

 Smoking Status  Reviewed: 19  Patient is a current smoker,  



     smokes every day  

 

 Exercise Type/Frequency    Exercises rarely  







Allergies, Adverse Reactions, Alerts







 Description

 

 No Known Drug Allergies







Medications







 Medication  Date  Status  Form  Strength  Qnty  SIG  Indications  Ordering



                 Provider

 

 Adderall  20  Active  Tablets  10mg  60tabs  take 1  R53.83  Mykel



   19          tablet by    Sebastien,



             mouth two    M.D.



             times daily    



             -- maximum    



             daily dose    



             of 2 per day    

 

 Baclofen  20  Active  Tablets  10mg  14tabs  take one  L40.50  Mykel



   18          tablet by    Sebastien,



             mouth twice    M.D.



             a day as    



             needed for    



             muscle    



             spasms -    



             avoid    



             driving and    



             other    



             muscular    



             relaxants    









 M25.512









 Ciclopirox  2018  Active  Cream  0.77%  30gm  apply to nail    Mykel



 Olamine            daily as    eleni Crowe    M.D.

 

 B12 Fast  08/15/2018  Active  Tablets  5000mcg  90tabs  sublingual    Mykel



 Dissolve      Dispers      daily    DAVID Crowe

 

 Duloxetine HCL  2018  Active  Caps DR  30mg  90caps  Take One    Mykel



       Part      Capsule By    Sebastien



             Mouth Daily    M.D.



             Along With 60    



             MG Dose    

 

 Clobetasol  2018  Active  Ointment  0.05%  30unit  Apply Once  L4  Mykel



 Propionate          s  Daily as Needed  0.  Sebastien,



             For Psoriasis,  50  M.D.



             Avoid Face And    



             Neck    

 

 Chantix Starting  2018  Active  Tablets  0.5mg X  55tabs  as directed,  
F1  Mykel



 Month Cory        11 & 1    stop smoking  7.  Sebastien,



         mg X 42      21  M.D.



               0  

 

 Chantix  2018  Active  Tablets  1mg  90tabs  day 8 to end of  F1  Mykel



             treatment: 1 mg  7.  Sebastien,



             by mouth twice  21  M.D.



             a day  0  

 

 Beeswax (Yellow)  2018  Active  Wax    3gm  apply daily for  F1  Mykel



             dry skin as  7.  Sebastien,



             needed  21  M.D.



               0  

 

 Enbrel Sureclick  2018  Active  Solution  50mg/ml  3.92un  inject 50mg  
L4  Mykel



       Auto-Injec    its  under the skin  0.  Sebastien,



       t      every week  50  M.D.

 

 Neoprene Patella  2017  Active  Misc    2units  use daily as    Mykel



 Knee            needed for    Sebastien,



 Support/Medium            right and left    M.D.



             knee    



             stabilization    



             715.19    

 

 Cymbalta  2017  Active  Caps DR  30mg  90caps  please take one    Mykel



       Part      capsule by    Sebastien,



             mouth daily    M.D.



             along with 60    



             mg dose    

 

 Duloxetine HCL  2017  Active  Caps DR  60mg  90caps  1 by mouth    Mykel



       Part      every day along    Sebastien,



             with 30 mg    M.D.



             capsule    

 

 Systane  2017  Active  Solution  0.4-0.3  30unit  apply twice  M7  Mykel



 Preservative        %  s  daily for dry  9.  Sebastien,



 Free            eyes  1  M.D.

 

 Sulfasalazine  2017  Active  Tablets  500mg  120tab  Take 2 Tablets  L4  
Mykel



           s  By Mouth In The  0.  Sebastien,



             Morning And In  50  M.D.



             The Evening For    



             A Total Of 4    



             Tablets Daily    



             Ongoing    

 

 Amitiza  2016  Active  Capsules  8mcg    1 by mouth    Unknown



             twice a day    

 

 Fentanyl  2016  Active  Patches  37.5mcg    1 patch every    Unknown



       72HR  /HR    72 hours    

 

 Oxycodone HCL  2016  Active  Tablets  20mg    1 tablet by    Unknown



             mouth 4 times a    



             day as needed    

 

 Gabapentin  2016  Active  Capsules  400mg  180cap  Take 1 Capsule  M7  
Mykel



           s  By Mouth Six  9.  Sebastien,



             Times A Day  7  M.D.

 

                 

 

 Nabumetone  2018 -  Hx  Tablets  500mg  60tabs  take one  L4  Mykel



   2018          capsule/tablet  0.  Sebastien,



             by mouth twice  50  M.D.



             daily as needed    



             for pain,    



             please stop    



             other nsaids,    



             avoid at same    



             time as    



             duloxetine    









 M25.512









 Diclofenac Sodium  10/16/2018 -  Hx  Gel  3%  100gm  apply twice  L40.50  
Mykel



   2018          daily to your    Sebastien, M.D.



             shoulder region    









 M25.512









 Provigil  08/15/2018 -  Hx  Tablets  100mg  30tabs  take one  R53.83  Mykel



   10/17/2018          capsule/tablet    Sebastien,



             daily by mouth    M.D.



             as needed for    



             hypersomnia mdd    



             1(failed    



             adderall)    

 

 Enbrel Mini  2018 -  Hx  Solution  50mg/ml  3.920ml  sq weekly  L40.50  
Mykel



   2018    Cartridge          Sebastien,



                 M.D.

 

 Adderall  2017 -  Hx  Tablets  5mg  120tabs  take two tablets  R53.83  
Mykel



   2019          by mouth twice a    Sebastien,



             day max/day=4    M.D.

 

 Nuvigil  10/23/2017 -  Hx  Tablets  50mg  30tabs  take one  L40.50  Mykel



   2017          capsule/tablet    Sebastien,



             daily by mouth    M.D.



             as needed for    



             hypersomnia    









 F51.11









 Diclofenac  2017 -  Hx  Tablets DR  75mg  60tabs  Take One  L40.50  
Mykel



 Sodium  10/16/2018          Tablet By    Sebastien,



             Mouth Twice A    M.D.



             Day as Needed    



             For Pain,    



             Avoid Other    



             NSAIDS    

 

 Naproxen  2017 -  Hx  Tablets  500mg  60tabs  1 tablet with    Mykel



   2017          food by mouth    Sebastien,



             twice a day    M.D.



             as needed for    



             pain, avoid    



             with other    



             NSAIDs (not    



             taking)    

 

 Calcipotriene  2017 -  Hx  Ointment  0.005%  60gm  apply a thin  L40.50  
Mykel



   2018          layer to    Sebastien,



             affected    M.D.



             areas once or    



             twice a day    



             and rub in    



             gently and    



             completely    

 

 Provigil  2017 -  Hx  Tablets  100mg  30tabs  take one  G47.14  Mykel



   2017          capsule/table    Sebastien,



             t daily by    M.D.



             mouth as    



             needed for    



             hypersomnia    

 

 Salivasure  2017 -  Hx  Lozenges    60units  use daily as  M79.1  Mykel



   2017          needed for    Sebastien,



             dryness of    M.D.



             the mouth    

 

 Estazolam  10/18/2016 -  Hx  Tablets  1mg  14tabs  1 po at  G47.00  Pat



   2017          bedtime x 3    Buckley, DNP,



             nights then    RN, FNP-BC



             off of 3    



             nights may    



             repeat weekly    

 

 Trazodone HCL  10/18/2016 -  Hx  Tablets  50mg  30tabs  Take One  M06.4  Mykel



   2018          Tablet By    Neyda Crowe AT    M.D.



             Bedtime as    



             Needed For    



             Insomnia    

 

 Diclofenac  2016 -  Hx  Tablets DR  75mg  60tabs  Take One    Mykel



 Sodium  2017          Tablet By    Sebastien



             Mouth Twice A    M.D.



             Day as Needed    



             For Pain    



             Avoid Other    



             NSAIDS    

 

 Folic Acid  2016 -  Hx  Tablets  1mg    1 by mouth    Unknown



   2016          every day    

 

 Meloxicam  2016 -  Hx  Tablets  7.5mg    take 1 tab by    Unknown



   2016          mouth qdaily    



             with food    

 

 Plaquenil  2016 -  Hx  Tablets  200mg    2 by mouth    Unknown



   2017          daily    

 

 Lexapro  2016 -  Hx  Tablets  20mg    1 by mouth    Unknown



   2017          every day    

 

 Cymbalta  2016 -  Hx  Caps DR  60mg    1 by mouth    Unknown



   2017    Part      every day    

 

 Methotrexate  2016 -  Hx  Tablets  2.5mg    4 tabs by    Unknown



   2016          mouth every    



             week    







Medications Administered in Office







 Medication  Date  Status  Form  Strength  Qnty  SIG  Indications  Ordering



                 Provider

 

 No Injection  20  Administered  Injection          Mykel Crowe M.D.

 

 No Injection  20  Administered  Injection          Mykel Crowe M.D.







Immunizations







 CPT Code  Status  Date  Vaccine  Reaction  Lot #

 

 [id number]  Given  08/15/2018  Pneumococcal Conjugate  no immediate reaction  [id number]



       Vaccine 13 Valent For  noted  



       Intramuscular Use    

 

 76483  Given  10/23/2017  Influenza Virus Vaccine,    [id number]



       Quadrivalent, Split,    



       Preservative Free    

 

 02122  Given  10/10/2016  Influenza Virus Vaccine,    



       Quadrivalent, Split,    



       Preservative Free    







Vital Signs







 Date  Vital  Result  Comment

 

 2019  3:28pm  Height  66 inches  5'6"









 Weight  150.00 lb  

 

 Heart Rate  80 /min  

 

 BP Systolic  120 mmHg  

 

 BP Diastolic  64 mmHg  

 

 Pain Level  9  

 

 O2 % BldC Oximetry  98 %  

 

 BMI (Body Mass Index)  24.2 kg/m2  









 2018 11:05am  Height  66 inches  5'6"









 Weight  143.00 lb  

 

 Heart Rate  84 /min  

 

 BP Systolic Sitting  108 mmHg  

 

 BP Diastolic Sitting  72 mmHg  

 

 Pain Level  9  

 

 BMI (Body Mass Index)  23.1 kg/m2  









 2018  3:50pm  Height  66 inches  5'6"









 Weight  141.00 lb  

 

 Heart Rate  113 /min  

 

 BP Systolic Sitting  140 mmHg  

 

 BP Diastolic Sitting  67 mmHg  

 

 Respiratory Rate  14 /min  

 

 Pain Level  10  

 

 BMI (Body Mass Index)  22.8 kg/m2  









 08/15/2018  3:25pm  Height  66 inches  5'6"









 Weight  146.00 lb  

 

 Heart Rate  96 /min  

 

 BP Systolic Sitting  124 mmHg  

 

 BP Diastolic Sitting  77 mmHg  

 

 Respiratory Rate  14 /min  

 

 Pain Level  7  

 

 BMI (Body Mass Index)  23.6 kg/m2  









 2018 11:32am  Height  66 inches  5'6"









 Weight  146.00 lb  

 

 Heart Rate  105 /min  

 

 BP Systolic Sitting  124 mmHg  

 

 BP Diastolic Sitting  82 mmHg  

 

 Respiratory Rate  14 /min  

 

 Pain Level  7  

 

 BMI (Body Mass Index)  23.6 kg/m2  









 2018  8:59am  Height  66 inches  5'6"









 Heart Rate  103 /min  

 

 BP Systolic Sitting  117 mmHg  

 

 BP Diastolic Sitting  78 mmHg  

 

 Respiratory Rate  14 /min  

 

 Pain Level  9  









 2017  1:00pm  Height  66 inches  5'6"









 Weight  158.00 lb  

 

 Heart Rate  100 /min  

 

 BP Systolic Sitting  90 mmHg  

 

 BP Diastolic Sitting  58 mmHg  

 

 Respiratory Rate  14 /min  

 

 Pain Level  7  

 

 BMI (Body Mass Index)  25.5 kg/m2  









 10/23/2017  3:05pm  Height  66 inches  5'6"









 Weight  158.19 lb  

 

 Heart Rate  105 /min  

 

 BP Systolic Sitting  121 mmHg  

 

 BP Diastolic Sitting  80 mmHg  

 

 Respiratory Rate  14 /min  

 

 Pain Level  7  

 

 BMI (Body Mass Index)  25.5 kg/m2  









 2017  9:17am  Height  66 inches  5'6"









 Weight  163.00 lb  

 

 Heart Rate  100 /min  

 

 BP Systolic Sitting  110 mmHg  

 

 BP Diastolic Sitting  70 mmHg  

 

 Respiratory Rate  14 /min  

 

 Pain Level  8  

 

 BMI (Body Mass Index)  26.3 kg/m2  









 2017 11:47am  Height  66 inches  5'6"









 Weight  166.00 lb  

 

 Heart Rate  101 /min  

 

 BP Systolic Sitting  124 mmHg  

 

 BP Diastolic Sitting  73 mmHg  

 

 Body Temperature  98.4 F  

 

 Pain Level  8  

 

 BMI (Body Mass Index)  26.8 kg/m2  









 2017  2:37pm  Height  66 inches  5'6"









 Weight  170.38 lb  

 

 Heart Rate  80 /min  

 

 BP Systolic Sitting  118 mmHg  

 

 BP Diastolic Sitting  80 mmHg  

 

 Respiratory Rate  14 /min  

 

 Body Temperature  96.8 F  

 

 BMI (Body Mass Index)  27.5 kg/m2  









 10/18/2016  3:05pm  Height  66 inches  5'6"









 Weight  171.00 lb  

 

 Heart Rate  89 /min  

 

 BP Systolic Sitting  138 mmHg  

 

 BP Diastolic Sitting  78 mmHg  

 

 Respiratory Rate  16 /min  

 

 O2 % BldC Oximetry  98 %  

 

 BMI (Body Mass Index)  27.6 kg/m2  









 10/18/2016  2:02pm  Height  68 inches  5'8"









 Weight  173.00 lb  

 

 Heart Rate  80 /min  

 

 BP Systolic Sitting  120 mmHg  

 

 BP Diastolic Sitting  60 mmHg  

 

 Respiratory Rate  14 /min  

 

 Body Temperature  98.0 F  

 

 Pain Level  5  

 

 BMI (Body Mass Index)  26.3 kg/m2  









 2016  2:00pm  Height  68 inches  5'8"









 Weight  172.12 lb  

 

 Heart Rate  72 /min  

 

 BP Systolic Sitting  142 mmHg  

 

 BP Diastolic Sitting  80 mmHg  

 

 Respiratory Rate  14 /min  

 

 Body Temperature  96.0 F  

 

 Pain Level  7  

 

 BMI (Body Mass Index)  26.2 kg/m2  









 2016  1:43pm  Height  68 inches  5'8"









 Weight  174.00 lb  

 

 Heart Rate  84 /min  

 

 BP Systolic Sitting  120 mmHg  

 

 BP Diastolic Sitting  84 mmHg  

 

 Respiratory Rate  14 /min  

 

 Body Temperature  98.1 F  

 

 Pain Level  8  

 

 BMI (Body Mass Index)  26.5 kg/m2  









 2016  2:17pm  Height  68 inches  5'8"









 Weight  174.00 lb  

 

 Heart Rate  82 /min  

 

 BP Systolic Sitting  126 mmHg  

 

 BP Diastolic Sitting  86 mmHg  

 

 Respiratory Rate  14 /min  

 

 Body Temperature  96.7 F  

 

 Pain Level  8  

 

 BMI (Body Mass Index)  26.5 kg/m2  









 2016 11:54am  Height  68 inches  5'8"









 Weight  170.00 lb  

 

 Heart Rate  78 /min  

 

 BP Systolic  124 mmHg  

 

 BP Diastolic  74 mmHg  

 

 Respiratory Rate  14 /min  

 

 O2 % BldC Oximetry  98 %  

 

 BMI (Body Mass Index)  25.8 kg/m2  

 

 Neck Circumference in inches  15  







Results







 Test  Date  Facility  Test  Result  H/L  Range  Note

 

 Quantiferon Gold TB  10/26/2018  [facility]  QuantiFERON  Negative 
   Negative  1



     [address]  -Tb Gold        



     [place] [address]  Plus        



     [phone]          









 TB1 Ag minus Nil Result  0 IU/mL      

 

 TB2 Ag minus Nil Result  0 IU/mL      

 

 TB Mitogen minus Nil Result  8.53 IU/mL      

 

 TB Nil Result  0.01 IU/mL      2









 Laboratory test  10/12/2018  [facility]  C Reactive  < 1.00  N  <
8.01  3



 finding    [address]  Protein  mg/L      



     [place], [place] [id number] [phone]          

 

 Basic Metabolic  10/12/2018  [facility]  Sodium  139 mmol/L  N  135-
145  



 Panel    [address]          



     [place] [address] [phone]          









 Potassium  4.3 mmol/L  N  3.5-5.0  

 

 Chloride  105 mmol/L  N  101-111  

 

 Co2 Carbon Dioxide  27 mmol/L  N  22-32  

 

 Anion Gap  7 mmol/L  N  2-11  

 

 Glucose  83 mg/dL  N    

 

 Blood Urea Nitrogen  10 mg/dL  N  6-24  

 

 Creatinine  0.69 mg/dL  N  0.51-0.95  

 

 BUN/Creatinine Ratio  14.5  N  8-20  

 

 Calcium  9.9 mg/dL  N  8.6-10.3  

 

 Egfr Non-  93.8    >60  

 

 Egfr   113.4    >60  4









 Laboratory test finding  10/12/2018  [facility]  Cortisol  7.70 g/
dL      5



     [address]          



     [place] [address] [phone]          









 Thyroperoxidase AB  0.70 IU/mL  N  <9  6









 Lyme Western  2018  [facility]  Lyme Disease  Negative    
Negative  



 Blot     DRIVE  IgG Ab WB        



     [place] [address] [phone]          









 Lyme Disease IgG Bands Present  No bands detecte <SEE NOTE> kDa      7

 

 Lyme Disease IgM Ab WB  Negative    Negative  

 

 Lyme Disease IgM Bands Present  No bands detecte <SEE NOTE> kDa      8

 

 Lyme Disease Interpretation  See Comment      9









 Laboratory test  2018  [facility]  Erythrocyte Sed  13 mm/Hr  
N  0-14  10



 finding    [address]  Rate        



     [place] [address] [phone]          









 C Reactive Protein  1.65 mg/L  N  <8.01  11









 CBC Auto Diff  2018  [facility]  White Blood  8.3 10^3/uL  N  
3.5-10.8  



     [address]  Count        



     [place] [address] [phone]          









 Red Blood Count  4.29 10^6/uL  N  4.00-5.40  

 

 Hemoglobin  14.5 g/dL  N  12.0-16.0  

 

 Hematocrit  42 %  N  35-47  

 

 Mean Corpuscular Volume  97 fL  N  80-97  

 

 Mean Corpuscular Hemoglobin  34 pg  High  27-31  

 

 Mean Corpuscular HGB Conc  35 g/dL  N  31-36  

 

 Red Cell Distribution Width  13 %  N  10.5-15  

 

 Platelet Count  308 10^3/uL  N  150-450  

 

 Mean Platelet Volume  7.8 um3  N  7.4-10.4  

 

 Abs Neutrophils  3.7 10^3/uL  N  1.5-7.7  

 

 Abs Lymphocytes  3.5 10^3/uL  N  1.0-4.8  

 

 Abs Monocytes  0.8 10^3/uL  N  0-0.8  

 

 Abs Eosinophils  0.3 10^3/uL  N  0-0.6  

 

 Abs Basophils  0.1 10^3/uL  N  0-0.2  

 

 Abs Nucleated RBC  0 10^3/uL      

 

 Granulocyte %  44.7 %  N  38-83  

 

 Lymphocyte %  42.2 %  N  25-47  

 

 Monocyte %  9.0 %  High  0-7  

 

 Eosinophil %  3.1 %  N  0-6  

 

 Basophil %  1.0 %  N  0-2  

 

 Nucleated Red Blood Cells %  0.1      









 Comp Metabolic Panel  2018  [facility]  Sodium  140 mmol/L  N
  135-145  



     [address]          



     [place] [address] [phone]          









 Chloride  103 mmol/L  N  101-111  

 

 Co2 Carbon Dioxide  30 mmol/L  N  22-32  

 

 Glucose  65 mg/dL  Low    

 

 Blood Urea Nitrogen  7 mg/dL  N  6-24  

 

 Creatinine  0.69 mg/dL  N  0.51-0.95  

 

 BUN/Creatinine Ratio  10.1  N  8-20  

 

 Calcium  9.7 mg/dL  N  8.6-10.3  

 

 Total Protein  7.0 g/dL  N  6.4-8.9  

 

 Albumin  4.4 g/dL  N  3.2-5.2  

 

 Globulin  2.6 g/dL  N  2-4  

 

 Albumin/Globulin Ratio  1.7  N  1-3  

 

 Total Bilirubin  0.30 mg/dL  N  0.2-1.0  

 

 Alkaline Phosphatase  42 U/L  N    

 

 Alt  9 U/L  N  7-52  

 

 Ast  14 U/L  N  13-39  

 

 Egfr Non-  93.8    >60  

 

 Egfr   113.4    >60  12

 

 Potassium  5.2 mmol/L  High  3.5-5.0  

 

 Anion Gap  7 mmol/L  N  2-11  









 Laboratory test  2018  [facility]  TSH (Thyroid  1.83 mcIU/mL
  N  0.34-5.60  13



 finding    [address]  Stim Horm)        



     [place] [address] [phone]          









 Folic Acid (Folate)  8.22 ng/mL    >3.99  14

 

 Vitamin B12  344 pg/mL  N  180-915  15

 

 Vitamin D, 1,25 Dihydroxy  19 pg/mL    18-  16









 Laboratory test  2018  [facility]  Erythrocyte Sed  13 mm/Hr  
N  0-14  17



 finding    [address]  Rate        



     [place] [address] [phone]          









 C Reactive Protein  1.65 mg/L  N  <8.01  18

 

 TSH (Thyroid Stim Horm)  1.83 mcIU/mL  N  0.34-5.60  19









 Vitamin B12 And  2018  [facility]  Vitamin B12  344 pg/mL  N  
180-918  20



 Folate Serum    [address]          



     [place] [address] [phone]          









 Folic Acid (Folate)  8.22 ng/mL    >3.99  21









 Laboratory test  2018  [facility]  Vitamin D,  19 pg/mL      22



 finding    [address]  1,25 Dihydroxy        



     [place] [address] [phone]          

 

 CBC Auto Diff  2018  [facility]  White Blood  8.3  N  3.5-
10.8  



     [address]  Count  10^3/uL      



     [place] [address] [phone]          









 Red Blood Count  4.29 10^6/uL  N  4.00-5.40  

 

 Hemoglobin  14.5 g/dL  N  12.0-16.0  

 

 Hematocrit  42 %  N  35-47  

 

 Mean Corpuscular Volume  97 fL  N  80-97  

 

 Mean Corpuscular Hemoglobin  34 pg  High  27-31  

 

 Mean Corpuscular HGB Conc  35 g/dL  N  31-36  

 

 Red Cell Distribution Width  13 %  N  10.5-15  

 

 Platelet Count  308 10^3/uL  N  150-450  

 

 Mean Platelet Volume  7.8 um3  N  7.4-10.4  

 

 Abs Neutrophils  3.7 10^3/uL  N  1.5-7.7  

 

 Abs Lymphocytes  3.5 10^3/uL  N  1.0-4.8  

 

 Abs Monocytes  0.8 10^3/uL  N  0-0.8  

 

 Abs Eosinophils  0.3 10^3/uL  N  0-0.6  

 

 Abs Basophils  0.1 10^3/uL  N  0-0.2  

 

 Abs Nucleated RBC  0 10^3/uL      

 

 Granulocyte %  44.7 %  N  38-83  

 

 Lymphocyte %  42.2 %  N  25-47  

 

 Monocyte %  9.0 %  High  0-7  

 

 Eosinophil %  3.1 %  N  0-6  

 

 Basophil %  1.0 %  N  0-2  

 

 Nucleated Red Blood Cells %  0.1      









 Comp Metabolic Panel  2018  [facility]  Sodium  140 mmol/L  N
  135-145  



     [address]          



     [place] [address] [phone]          









 Chloride  103 mmol/L  N  101-111  

 

 Co2 Carbon Dioxide  30 mmol/L  N  22-32  

 

 Glucose  65 mg/dL  Low    

 

 Blood Urea Nitrogen  7 mg/dL  N  6-24  

 

 Creatinine  0.69 mg/dL  N  0.51-0.95  

 

 BUN/Creatinine Ratio  10.1  N  8-20  

 

 Calcium  9.7 mg/dL  N  8.6-10.3  

 

 Total Protein  7.0 g/dL  N  6.4-8.9  

 

 Albumin  4.4 g/dL  N  3.2-5.2  

 

 Globulin  2.6 g/dL  N  2-4  

 

 Albumin/Globulin Ratio  1.7  N  1-3  

 

 Total Bilirubin  0.30 mg/dL  N  0.2-1.0  

 

 Alkaline Phosphatase  42 U/L  N    

 

 Alt  9 U/L  N  7-52  

 

 Ast  14 U/L  N  13-39  

 

 Egfr Non-  93.8    >60  

 

 Egfr   113.4    >60  23

 

 Potassium  5.2 mmol/L  High  3.5-5.0  

 

 Anion Gap  7 mmol/L  N  2-11  









 Laboratory test  2018  [facility]  C Reactive  < 1.00  N  < 
5.00  24



 finding    [address]  Protein  mg/L      



     [place] [address] [phone]          

 

 Comp Metabolic  2018  [facility]  Sodium  139 mmol/L  N  133-
145  



 Panel    [address]          



     [place] [address] [phone]          









 Potassium  4.5 mmol/L  N  3.5-5.0  

 

 Chloride  104 mmol/L  N  101-111  

 

 Co2 Carbon Dioxide  31 mmol/L  N  22-32  

 

 Anion Gap  4 mmol/L  N  2-11  

 

 Glucose  69 mg/dL  Low    

 

 Blood Urea Nitrogen  9 mg/dL  N  6-24  

 

 Creatinine  0.75 mg/dL  N  0.51-0.95  

 

 BUN/Creatinine Ratio  12.0  N  8-20  

 

 Calcium  9.7 mg/dL  N  8.6-10.3  

 

 Total Protein  7.0 g/dL  N  6.4-8.9  

 

 Albumin  4.4 g/dL  N  3.2-5.2  

 

 Globulin  2.6 g/dL  N  2-4  

 

 Albumin/Globulin Ratio  1.7  N  1-3  

 

 Total Bilirubin  0.50 mg/dL  N  0.2-1.0  

 

 Alkaline Phosphatase  44 U/L  N    

 

 Alt  8 U/L  N  7-52  

 

 Ast  13 U/L  N  13-39  

 

 Egfr Non-  85.6    >60  

 

 Egfr   110.1    >60  25









 CBC Auto Diff  2018  [facility]  White Blood  8.0 10^3/uL  N  
3.5-10.8  



     [address]  Count        



     [place] [address] [phone]          









 Red Blood Count  4.25 10^6/uL  N  4.0-5.4  

 

 Hemoglobin  14.2 g/dL  N  12.0-16.0  

 

 Hematocrit  41 %  N  35-47  

 

 Mean Corpuscular Volume  97 fL  N  80-97  

 

 Mean Corpuscular Hemoglobin  33 pg  High  27-31  

 

 Mean Corpuscular HGB Conc  34 g/dL  N  31-36  

 

 Red Cell Distribution Width  13 %  N  10.5-15  

 

 Platelet Count  281 10^3/uL  N  150-450  

 

 Mean Platelet Volume  8 um3  N  7.4-10.4  

 

 Abs Neutrophils  4.2 10^3/uL  N  1.5-7.7  

 

 Abs Lymphocytes  2.9 10^3/uL  N  1.0-4.8  

 

 Abs Monocytes  0.5 10^3/uL  N  0-0.8  

 

 Abs Eosinophils  0.3 10^3/uL  N  0-0.6  

 

 Abs Basophils  0.1 10^3/uL  N  0-0.2  

 

 Abs Nucleated RBC  0 10^3/uL      

 

 Granulocyte %  52.2 %  N  38-83  

 

 Lymphocyte %  37.0 %  N  25-47  

 

 Monocyte %  6.7 %  N  1-9  

 

 Eosinophil %  3.2 %  N  0-6  

 

 Basophil %  0.9 %  N  0-2  

 

 Nucleated Red Blood Cells %  0.1      









 Laboratory test  2018  [facility]  Erythrocyte Sed  20 mm/Hr  
High  0-14  



 finding    [address]  Rate        



     [place] [address] [phone]          

 

 CBC Auto Diff  10/10/2017  [facility]  White Blood  7.4  N  3.5-
10.8  



     [address]  Count  10^3/uL      



     [place] [address] [phone]          









 Red Blood Count  4.20 10^6/uL  N  4.0-5.4  

 

 Hemoglobin  13.5 g/dL  N  12.0-16.0  

 

 Hematocrit  39 %  N  35-47  

 

 Mean Corpuscular Volume  94 fL  N  80-97  

 

 Mean Corpuscular Hemoglobin  32 pg  High  27-31  

 

 Mean Corpuscular HGB Conc  34 g/dL  N  31-36  

 

 Red Cell Distribution Width  13 %  N  10.5-15  

 

 Platelet Count  273 10^3/uL  N  150-450  

 

 Mean Platelet Volume  8 um3  N  7.4-10.4  

 

 Abs Neutrophils  4.2 10^3/uL  N  1.5-7.7  

 

 Abs Lymphocytes  2.6 10^3/uL  N  1.0-4.8  

 

 Abs Monocytes  0.5 10^3/uL  N  0-0.8  

 

 Abs Eosinophils  0 10^3/uL  N  0-0.6  

 

 Abs Basophils  0.1 10^3/uL  N  0-0.2  

 

 Abs Nucleated RBC  0.01 10^3/uL  N    

 

 Granulocyte %  56.2 %  N  38-83  

 

 Lymphocyte %  35.4 %  N  25-47  

 

 Monocyte %  6.9 %  N  1-9  

 

 Eosinophil %  0.4 %  N  0-6  

 

 Basophil %  1.1 %  N  0-2  

 

 Nucleated Red Blood Cells %  0.1  N    









 Comp Metabolic Panel  10/10/2017  [facility]  Sodium  135 mmol/L  N
  133-145  



     [address]          



     [place] [address] [phone]          









 Potassium  4.1 mmol/L  N  3.5-5.0  

 

 Chloride  102 mmol/L  N  101-111  

 

 Co2 Carbon Dioxide  29 mmol/L  N  22-32  

 

 Anion Gap  4 mmol/L  N  2-11  

 

 Glucose  101 mg/dL  High    

 

 Blood Urea Nitrogen  12 mg/dL  N  6-24  

 

 Creatinine  0.69 mg/dL  N  0.51-0.95  

 

 BUN/Creatinine Ratio  17.4  N  8-20  

 

 Calcium  9.1 mg/dL  N  8.6-10.3  

 

 Total Protein  6.8 g/dL  N  6.4-8.9  

 

 Albumin  4.1 g/dL  N  3.2-5.2  

 

 Globulin  2.7 g/dL  N  2-4  

 

 Albumin/Globulin Ratio  1.5  N  1-3  

 

 Total Bilirubin  0.30 mg/dL  N  0.2-1.0  

 

 Alkaline Phosphatase  42 U/L  N    

 

 Alt  7 U/L  N  7-52  

 

 Ast  12 U/L  Low  13-39  

 

 Egfr Non-  94.2  N  >60  

 

 Egfr   121.2  N  >60  26









 Laboratory test  10/10/2017  [facility]  Creatine  54 U/L  N  10-
223  27



 finding    [address]  Kinase(CK)        



     [place] [address] [phone]          









 C Reactive Protein  2.57 mg/L  N  < 5.00  28









 Laboratory test  2017  [facility]  Erythrocyte Sed  28 mm/Hr  
High  0-14  29



 finding    [address]  Rate        



     [place] [address] [phone]          









 C Reactive Protein  3.97 mg/L  N  < 5.00  30









 Ssa/SSB Abs Igg  2017  [facility]  SS-A/Ro Antibody  <0.2 U  
N    31



     [address]          



     [place] [address] [phone]          









 SS-B/La Antibody  <0.2 U  N    32









 Laboratory test  2017  [facility]  Hemoglobin A1c  5.2 %  N  
Less than  33



 finding    [address]  (Glyco HGB)      6.0  



     [place] [address] [phone]          

 

 Laboratory test  2017  [facility]  C Reactive  < 1.00  N  < 
5.00  34



 finding    [address]  Protein  mg/L      



     [place] [address] [phone]          









 Erythrocyte Sed Rate  13 mm/Hr  N  0-14  









 CBC W/Auto  2017  [facility]  White Blood  9.6 10^3/uL  N  3.5
-10.8  



 Diff    [address]  Count        



     [place] [address] [phone]          









 Red Blood Count  4.51 10^6/uL  N  4.0-5.4  

 

 Hemoglobin  14.3 g/dL  N  12.0-16.0  

 

 Hematocrit  42 %  N  35-47  

 

 Mean Corpuscular Volume  94 fL  N  80-97  

 

 Mean Corpuscular Hemoglobin  32 pg  High  27-31  

 

 Mean Corpuscular HGB Conc  34 g/dL  N  31-36  

 

 Red Cell Distribution Width  14 %  N  10.5-15  

 

 Platelet Count  281 10^3/uL  N  150-450  

 

 Mean Platelet Volume  8 um3  N  7.4-10.4  

 

 Abs Neutrophils  5.0 10^3/uL  N  1.5-7.7  

 

 Abs Lymphocytes  3.5 10^3/uL  N  1.0-4.8  

 

 Abs Monocytes  0.7 10^3/uL  N  0-0.8  

 

 Abs Eosinophils  0.3 10^3/uL  N  0-0.6  

 

 Abs Basophils  0.1 10^3/uL  N  0-0.2  

 

 Abs Nucleated RBC  0.01 10^3/uL  N    

 

 Granulocyte %  52.0 %  N  38-83  

 

 Lymphocyte %  36.3 %  N  25-47  

 

 Monocyte %  7.8 %  N  1-9  

 

 Eosinophil %  3.1 %  N  0-6  

 

 Basophil %  0.8 %  N  0-2  

 

 Nucleated Red Blood Cells %  0.1  N    









 CMP Panel  2017  [facility]  Sodium  136 mmol/L  N  133-145  



     [address]          



     [place] [address] [phone]          









 Potassium  3.8 mmol/L  N  3.5-5.0  

 

 Chloride  103 mmol/L  N  101-111  

 

 Co2 Carbon Dioxide  28 mmol/L  N  22-32  

 

 Anion Gap  5 mmol/L  N  2-11  

 

 Glucose  107 mg/dL  High    

 

 Blood Urea Nitrogen  9 mg/dL  N  6-24  

 

 Creatinine  0.68 mg/dL  N  0.51-0.95  

 

 BUN/Creatinine Ratio  13.2  N  8-20  

 

 Calcium  9.7 mg/dL  N  8.6-10.3  

 

 Total Protein  7.2 g/dL  N  6.4-8.9  

 

 Albumin  4.5 g/dL  N  3.2-5.2  

 

 Globulin  2.7 g/dL  N  2-4  

 

 Albumin/Globulin Ratio  1.7  N  1-3  

 

 Total Bilirubin  0.40 mg/dL  N  0.2-1.0  

 

 Alkaline Phosphatase  49 U/L  N    

 

 Alt  8 U/L  N  7-52  

 

 Ast  12 U/L  Low  13-39  

 

 Egfr Non-  96.3  N  >60  

 

 Egfr   123.9  N  >60  35









 Laboratory test  2016  [facility]  Rheumatoid Factor  <15 IU/
mL  N  <15  36



 finding    [address]          



     [place] [address] [phone]          









 Cyclic Citrullinated Pep Igg  <15.6 U  N    37









 Laboratory test  2016  [facility]  Creatine  81 U/L  N  10-
223  38



 finding    [address]  Kinase(CK)        



     [place] [address] [phone]          

 

 Laboratory test  2016  [facility]  Erythrocyte Sed  21 mm/Hr  
High  0-14  39



 finding    [address]  Rate        



     [place] [address] [phone]          









 C Reactive Protein  2.12 mg/L  N  < 5.00  40









 CBC Auto Diff  2016  [facility]  White Blood  8.6 10^3/uL  N  
3.5-10.8  



     [address]  Count        



     [place] [address] [phone]          









 Red Blood Count  4.44 10^6/uL  N  4.0-5.4  

 

 Hemoglobin  14.4 g/dL  N  12.0-16.0  

 

 Hematocrit  41 %  N  35-47  

 

 Mean Corpuscular Volume  93 fL  N  80-97  

 

 Mean Corpuscular Hemoglobin  33 pg  High  27-31  

 

 Mean Corpuscular HGB Conc  35 g/dL  N  31-36  

 

 Red Cell Distribution Width  13 %  N  10.5-15  

 

 Platelet Count  337 10^3/uL  N  150-450  

 

 Mean Platelet Volume  8 um3  N  7.4-10.4  

 

 Abs Neutrophils  4.7 10^3/uL  N  1.5-7.7  

 

 Abs Lymphocytes  3.1 10^3/uL  N  1.0-4.8  

 

 Abs Monocytes  0.5 10^3/uL  N  0-0.8  

 

 Abs Eosinophils  0.2 10^3/uL  N  0-0.6  

 

 Abs Basophils  0.1 10^3/uL  N  0-0.2  

 

 Abs Nucleated RBC  0 10^3/uL  N    

 

 Granulocyte %  54.7 %  N  38-83  

 

 Lymphocyte %  35.5 %  N  25-47  

 

 Monocyte %  6.1 %  N  1-9  

 

 Eosinophil %  2.5 %  N  0-6  

 

 Basophil %  1.2 %  N  0-2  

 

 Nucleated Red Blood Cells %  0  N    









 Comp Metabolic Panel  2016  [facility]  Sodium  137 mmol/L  N
  133-145  



     [address]          



     [place] [address] [phone]          









 Potassium  4.2 mmol/L  N  3.5-5.0  

 

 Chloride  103 mmol/L  N  101-111  

 

 Co2 Carbon Dioxide  29 mmol/L  N  22-32  

 

 Anion Gap  5 mmol/L  N  2-11  

 

 Glucose  101 mg/dL  High    

 

 Blood Urea Nitrogen  10 mg/dL  N  6-24  

 

 Creatinine  0.72 mg/dL  N  0.51-0.95  

 

 BUN/Creatinine Ratio  13.9  N  8-20  

 

 Calcium  9.7 mg/dL  N  8.6-10.3  

 

 Total Protein  7.2 g/dL  N  6.4-8.9  

 

 Albumin  4.3 g/dL  N  3.2-5.2  

 

 Globulin  2.9 g/dL  N  2-4  

 

 Albumin/Globulin Ratio  1.5  N  1-3  

 

 Total Bilirubin  0.40 mg/dL  N  0.2-1.0  

 

 Alkaline Phosphatase  45 U/L  N    

 

 Alt  10 U/L  N  7-52  

 

 Ast  12 U/L  Low  13-39  

 

 Egfr Non-  90.2  N  >60  

 

 Egfr   116.0  N  >60  41









 Laboratory test  2016  [facility]  TSH (Thyroid  1.89 mcIU/mL
  N  0.34-5.60  42



 finding    [address]  Stim Horm)        



     [place] [address] [phone]          









 Vitamin B12  410 pg/mL  N  180-914  43

 

 Lyme Disease Serology  Negative  N  Negative  44









 Laboratory test  2016  [facility]  Creatine  72 U/L  N  10-
223  45



 finding    [address]  Kinase(CK)        



     [place] [address] [phone]          









 Aldolase  5.8 U/L  N  <7.7  46

 

 Cortisol  2.51 ?g/dL  N    47









 Hla B27  2016  [facility]  Hla B27  Negative  N    48



     [address]          



     [place] [address] [phone]          









 Hla B27 Interp  See Comment  N    49









 Laboratory test  2016  [facility]  Lyme Disease  Negative  N  
Negative  50



 finding    [address]  PCR        



     [place] [address] [phone]          









 Cyclic Citrullinated Pep Igg  TNP  N    51

 

 Erythrocyte Sed Rate  15 mm/Hr  High  0-14  52









 Celiac Panel  2016  [facility]  Tissue Transglutaminase  <1.2 
U/mL  N    53



     [address]  IgA Ab        



     [place] [address] [phone]          









 Immunoglobulin A  384 mg/dL  Abnormal  61 - 356  

 

 Celiac Interpretation  See Comment  N    54









 Connective Tissue  2016  [facility]  Anti-Nuclear  0.4 U  N  
  55



 Panel    [address]  Antibody        



     [place] [address] [phone]          









 Cyclic Citrullinated Peptide  <15.6 U  N    56

 

 Interpretation  See Comment  N    57









 Iron & Iron Binding  2016  [facility]  Iron  70 g/dL  N  50-
212  



 Capacity    [address] [place] [address] [phone]          









 Unsaturated Iron Binding  307 g/dL  N    

 

 Total Iron Binding Capacity  377 g/dL  N  250-450  

 

 % Iron Saturation  19 %  N  15-55  









 Hepatitis  2016  [facility]  Hepatitis B  Nonreactive  N  
Nonreactive  



 Acute Panel    [address]  Surface        



     [place] [address]  Antigen        



     [phone]          









 Hepatitis B Core IgM  Nonreactive  N  Nonreactive  

 

 Hepatitis C Antibody  Nonreactive  N  Nonreactive  

 

 Hepatitis A AB IgM  Nonreactive  N  Nonreactive  









 Laboratory test  2016  [facility]  Magnesium  2.0 mg/dL  N  
1.9-2.7  58



 finding    [address] [place] [address] [phone]          









 Vitamin B12  504 pg/mL  N  180-914  59









 Celiac Hla DQ1/DQ2  2016  [facility]  Hla-Dqa1  SEE BELOW  N 
   60



     [address] [place] [address] [phone]          









 Hla-DQB1  SEE BELOW  N    61

 

 Celiac Gene Pairs Present?  Yes  N    

 

 Celiac Gene Interpretation  See Comment  N    62









 Comp Metabolic Panel  2016  [facility]  Sodium  136 mmol/L  N
  133-145  



     [address] [place] [address] [phone]          









 Potassium  4.4 mmol/L  N  3.5-5.0  

 

 Chloride  101 mmol/L  N  101-111  

 

 Co2 Carbon Dioxide  27 mmol/L  N  22-32  

 

 Anion Gap  8 mmol/L  N  2-11  

 

 Glucose  79 mg/dL  N    

 

 Blood Urea Nitrogen  9 mg/dL  N  6-24  

 

 Creatinine  0.72 mg/dL  N  0.51-0.95  

 

 BUN/Creatinine Ratio  12.5  N  8-20  

 

 Calcium  9.0 mg/dL  N  8.6-10.3  

 

 Total Protein  6.8 g/dL  N  6.4-8.9  

 

 Albumin  4.2 g/dL  N  3.2-5.2  

 

 Globulin  2.6 g/dL  N  2-4  

 

 Albumin/Globulin Ratio  1.6  N  1-3  

 

 Total Bilirubin  0.30 mg/dL  N  0.2-1.0  

 

 Alkaline Phosphatase  49 U/L  N    

 

 Alt  74 U/L  High  7-52  

 

 Ast  44 U/L  High  13-39  

 

 Egfr Non-  90.2  N  >60  

 

 Egfr   116.0  N  >60  63









 CBC Auto Diff  2016  [facility]  White Blood  7.2 10^3/uL  N  
3.5-10.8  



     [address]  Count        



     [place] [address] [phone]          









 Red Blood Count  4.28 10^6/uL  N  4.0-5.4  

 

 Hemoglobin  13.7 g/dL  N  12.0-16.0  

 

 Hematocrit  40 %  N  35-47  

 

 Mean Corpuscular Volume  93 fL  N  80-97  

 

 Mean Corpuscular Hemoglobin  32 pg  High  27-31  

 

 Mean Corpuscular HGB Conc  34 g/dL  N  31-36  

 

 Red Cell Distribution Width  14 %  N  10.5-15  

 

 Platelet Count  278 10^3/uL  N  150-450  

 

 Mean Platelet Volume  8 um3  N  7.4-10.4  

 

 Abs Neutrophils  3.6 10^3/uL  N  1.5-7.7  

 

 Abs Lymphocytes  2.9 10^3/uL  N  1.0-4.8  

 

 Abs Monocytes  0.5 10^3/uL  N  0-0.8  

 

 Abs Eosinophils  0.1 10^3/uL  N  0-0.6  

 

 Abs Basophils  0.1 10^3/uL  N  0-0.2  

 

 Abs Nucleated RBC  0 10^3/uL  N    

 

 Granulocyte %  49.9 %  N  38-83  

 

 Lymphocyte %  39.7 %  N  25-47  

 

 Monocyte %  7.6 %  N  1-9  

 

 Eosinophil %  1.7 %  N  0-6  

 

 Basophil %  1.1 %  N  0-2  

 

 Nucleated Red Blood Cells %  0  N    









 Vitamin D 1,25  2016  [facility]  Vitamin D Total  34.5 ng/mL
  N  30-50  64



 And Vitamin D,2    [address]  25(Oh)        



     [place] [address] [phone]          









 Vitamin D, 1,25 Dihydroxy  57 pg/mL  N  18-78  65









 Laboratory test  2016  [facility]  Rheumatoid Factor  <15 IU/
mL  N  <15  66



 finding    [address]          



     [place] [address] [phone]          









 Uric Acid  4.8 mg/dL  N  2.3-6.6  67

 

 TSH (Thyroid Stim Horm)  2.73 mcIU/mL  N  0.34-5.60  68









 1  No interferon-gamma response to M. tuberculosis antigens



   was detected. Infection with M. tuberculosis is unlikely.



   A single negative result does not exclude infection with



   M. tuberculosis. In patients at high risk for M.tuberculosis



   infection, a second test should be considered in accordance



   with the 2017 ATS/IDSA/CDC Clinical Practice Guidelines



   for Diagnosis of Tuberculosis in Adults and Children



   [Lewinsohn ARISTIDES et. al. Clin. Infect. Dis.



   2017;64(2):111-115].

 

 2  Test Performed by:



   [facility]



   [address] [place] [address]

 

 3  Please check labs today

 

 4  *******Because ethnic data is not always readily available,



   this report includes an eGFR for both -Americans and



   non- Americans.****



   The National Kidney Disease Education Program (NKDEP) does



   not endorse the use of the MDRD equation for patients that



   are not between the ages of 18 and 70, are pregnant, have



   extremes of body size, muscle mass, or nutritional status,



   or are non- or non-.



   According to the National Kidney Foundation, irrespective of



   diagnosis, the stage of the disease is based on the level of



   kidney function:



   Stage Description                      GFR(mL/min/1.73 m(2))



   1     Kidney damage with normal or decreased GFR       90



   2     Kidney damage with mild decrease in GFR          60-89



   3     Moderate decrease in GFR                         30-59



   4     Severe decrease in GFR                           15-29



   5     Kidney failure                       <15 (or dialysis)

 

 5  AM 8.7-22.4



   PM <10

 

 6  Please check labs today

 

 7  No bands detected

 

 8  No bands detected

 

 9  Specific serologic response to B. burgdorferi infection is



   not detected, but cannot rule out early infection during



   which low or undetectable antibody levels to B. burgdorferi



   may be present.  If clinically indicated, a new serum



   specimen should be submitted in 7-14 days.



   -------------------ADDITIONAL INFORMATION-------------------



   Per CDC criteria, the Lyme IgG Immunoblot is interpreted as



   positive if IgG-class antibodies are detected to >=5 B.



   burgdorferi proteins, and the Lyme IgM Immunoblot is



   interpreted as positive if IgM-class antibodies are



   detected to >=2 B. burgdorferi proteins. Immunoblot



   patterns not meeting these criteria should not be



   interpreted as positive. Epitopes from certain B.



   burgdorferi proteins (e.g., p41) are conserved across other



   bacteria, which may lead to the detection of IgM- and/or



   IgG-class antibodies on the Lyme disease immunoblots in



   patients without Lyme disease. Immunoblot should only be



   ordered on specimens that are positive or equivocal by a



   FDA-licensed Lyme disease antibody screening test (e.g.,



   EIA). Results of the Lyme IgM immunoblot should not be



   considered in patients with >=30 days of symptoms.



   Test Performed by:



   [facility] - [address] [address]

 

 10  Please check this week

 

 11  Please check this week

 

 12  *******Because ethnic data is not always readily available,



   this report includes an eGFR for both -Americans and



   non- Americans.****



   The National Kidney Disease Education Program (NKDEP) does



   not endorse the use of the MDRD equation for patients that



   are not between the ages of 18 and 70, are pregnant, have



   extremes of body size, muscle mass, or nutritional status,



   or are non- or non-.



   According to the National Kidney Foundation, irrespective of



   diagnosis, the stage of the disease is based on the level of



   kidney function:



   Stage Description                      GFR(mL/min/1.73 m(2))



   1     Kidney damage with normal or decreased GFR       90



   2     Kidney damage with mild decrease in GFR          60-89



   3     Moderate decrease in GFR                         30-59



   4     Severe decrease in GFR                           15-29



   5     Kidney failure                       <15 (or dialysis)

 

 13  Please check this week

 

 14  Please check this week

 

 15  Normal Range 180 to 914



   Indeterminate Range 145 to 180



   Deficient Range  <145

 

 16  -------------------ADDITIONAL INFORMATION-------------------



   This test was developed and its performance characteristics



   determined by [facility] in a manner consistent with CLIA



   requirements. This test has not been cleared or approved by



   the U.S. Food and Drug Administration.



   Test Performed by:



   [facility] - [address] [address]

 

 17  Please check this week

 

 18  Please check this week

 

 19  Please check this week

 

 20  Normal Range 180 to 914



   Indeterminate Range 145 to 180



   Deficient Range  <145

 

 21  Please check this week

 

 22  -------------------ADDITIONAL INFORMATION-------------------



   This test was developed and its performance characteristics



   determined by [facility] in a manner consistent with CLIA



   requirements. This test has not been cleared or approved by



   the U.S. Food and Drug Administration.



   Test Performed by:



   [facility]



   [address] [facility] [facility], [place] [address]

 

 23  *******Because ethnic data is not always readily available,



   this report includes an eGFR for both -Americans and



   non- Americans.****



   The National Kidney Disease Education Program (NKDEP) does



   not endorse the use of the MDRD equation for patients that



   are not between the ages of 18 and 70, are pregnant, have



   extremes of body size, muscle mass, or nutritional status,



   or are non- or non-.



   According to the National Kidney Foundation, irrespective of



   diagnosis, the stage of the disease is based on the level of



   kidney function:



   Stage Description                      GFR(mL/min/1.73 m(2))



   1     Kidney damage with normal or decreased GFR       90



   2     Kidney damage with mild decrease in GFR          60-89



   3     Moderate decrease in GFR                         30-59



   4     Severe decrease in GFR                           15-29



   5     Kidney failure                       <15 (or dialysis)

 

 24  Acute inflammation:  >10.00

 

 25  *******Because ethnic data is not always readily available,



   this report includes an eGFR for both -Americans and



   non- Americans.****



   The National Kidney Disease Education Program (NKDEP) does



   not endorse the use of the MDRD equation for patients that



   are not between the ages of 18 and 70, are pregnant, have



   extremes of body size, muscle mass, or nutritional status,



   or are non- or non-.



   According to the National Kidney Foundation, irrespective of



   diagnosis, the stage of the disease is based on the level of



   kidney function:



   Stage Description                      GFR(mL/min/1.73 m(2))



   1     Kidney damage with normal or decreased GFR       90



   2     Kidney damage with mild decrease in GFR          60-89



   3     Moderate decrease in GFR                         30-59



   4     Severe decrease in GFR                           15-29



   5     Kidney failure                       <15 (or dialysis)

 

 26  *******Because ethnic data is not always readily available,



   this report includes an eGFR for both -Americans and



   non- Americans.****



   The National Kidney Disease Education Program (NKDEP) does



   not endorse the use of the MDRD equation for patients that



   are not between the ages of 18 and 70, are pregnant, have



   extremes of body size, muscle mass, or nutritional status,



   or are non- or non-.



   According to the National Kidney Foundation, irrespective of



   diagnosis, the stage of the disease is based on the level of



   kidney function:



   Stage Description                      GFR(mL/min/1.73 m(2))



   1     Kidney damage with normal or decreased GFR       90



   2     Kidney damage with mild decrease in GFR          60-89



   3     Moderate decrease in GFR                         30-59



   4     Severe decrease in GFR                           15-29



   5     Kidney failure                       <15 (or dialysis)

 

 27  Please check labs this week

 

 28  Acute inflammation:  >10.00

 

 29  Please check fasting

 

 30  Acute inflammation:  >10.00

 

 31  -------------------REFERENCE VALUE--------------------------



   <1.0 (Negative)

 

 32  -------------------REFERENCE VALUE--------------------------



   <1.0 (Negative)



   Test Performed by:



   [place] [address]

 

 33  Therapeutic target for the treatment of diabetes



   [facility] patients is <7% HBA1C, and in selective



   patients <6.0%.Please refer to American Diabetes



   Association Diabetic care guidelines for further



   information.

 

 34  Acute inflammation:  >10.00

 

 35  *******Because ethnic data is not always readily available,



   this report includes an eGFR for both -Americans and



   non- Americans.****



   The National Kidney Disease Education Program (NKDEP) does



   not endorse the use of the MDRD equation for patients that



   are not between the ages of 18 and 70, are pregnant, have



   extremes of body size, muscle mass, or nutritional status,



   or are non- or non-.



   According to the National Kidney Foundation, irrespective of



   diagnosis, the stage of the disease is based on the level of



   kidney function:



   Stage Description                      GFR(mL/min/1.73 m(2))



   1     Kidney damage with normal or decreased GFR       90



   2     Kidney damage with mild decrease in GFR          60-89



   3     Moderate decrease in GFR                         30-59



   4     Severe decrease in GFR                           15-29



   5     Kidney failure                       <15 (or dialysis)

 

 36  Test Performed by:



   [place] [address]



   : Gus Roach II, M.D., Ph.D.

 

 37  -------------------REFERENCE VALUE--------------------------



   <20.0 (Negative)



   Test Performed by:



   [place] [address]



   : Gus Roach II, M.D., Ph.D.

 

 38  Please check in 1 month

 

 39  Please check labs this week

 

 40  Acute inflammation:  >10.00

 

 41  *******Because ethnic data is not always readily available,



   this report includes an eGFR for both -Americans and



   non- Americans.****



   The National Kidney Disease Education Program (NKDEP) does



   not endorse the use of the MDRD equation for patients that



   are not between the ages of 18 and 70, are pregnant, have



   extremes of body size, muscle mass, or nutritional status,



   or are non- or non-.



   According to the National Kidney Foundation, irrespective of



   diagnosis, the stage of the disease is based on the level of



   kidney function:



   Stage Description                      GFR(mL/min/1.73 m(2))



   1     Kidney damage with normal or decreased GFR       90



   2     Kidney damage with mild decrease in GFR          60-89



   3     Moderate decrease in GFR                         30-59



   4     Severe decrease in GFR                           15-29



   5     Kidney failure                       <15 (or dialysis)

 

 42  Please check labs this week

 

 43  Normal Range 180 to 914



   Indeterminate Range 145 to 180



   Deficient Range  <145

 

 44  Serologic response to B. burgdorferi infection is not



   detected, but cannot rule out early infection during



   which low or undetectable antibody levels to



   B. burgdorferi may be present. If clinically indicated,



   a new serum specimen should be submitted in 7-14 days.



   Test Performed by:



   [place] [address]



   : Gus Roach II, M.D., Ph.D.

 

 45  please check this week

 

 46  Test Performed by:



   [place] [address]



   : Gus Roach II, M.D., Ph.D.

 

 47  AM 8.7-22.4



   PM <10

 

 48  -------------------REFERENCE VALUE--------------------------



   Not Applicable

 

 49  RESULT: HLA-B27 antigen was not detected.



   -------------------ADDITIONAL INFORMATION-------------------



   Method: Flow Cytometry



   Performing Laboratory CLIA# [id number]



   Test Performed by:



   [place] [address]



   : Gus Roach II, M.D., Ph.D.

 

 50  A negative result does not exclude infection with



   Borrelia burgdorferi. Serologic testing as per



   CDC guidelines may be indicated.



   -------------------ADDITIONAL INFORMATION-------------------



   Laboratory developed test.



   Test Performed by:



   [place] [address]



   : Gus Roach II, M.D., Ph.D.

 

 51  Cancelled due to duplicate test on this order



   Test Performed by:



   [place] [address]



   : Gus Roach II, M.D., Ph.D.

 

 52  please check this week

 

 53  -------------------REFERENCE VALUE--------------------------



   <4.0 (Negative)



   Test Performed by:



   [place] [address]



   : Gus Roach II, M.D., Ph.D.

 

 54  Negative serology. Celiac disease unlikely. However,



   approximately 10% of patients with celiac disease are



   seronegative. Also, patients who are already adhering to a



   gluten-free diet may be seronegative. If celiac disease is



   highly clinically suspected, consider HLA-DQ typing.



   Test Performed by:



   [facility] - [address] [address]



   : Gus Roach II, M.D., Ph.D.

 

 55  -------------------REFERENCE VALUE--------------------------



   <=1.0 (Negative)

 

 56  -------------------REFERENCE VALUE--------------------------



   <20.0 (Negative)

 

 57  Tests for antibodies to dsDNA and SÁNCHEZ antigens are not



   performed automatically unless the JANEY result is > or=



   3.0 U.  Studies performed at [facility] indicate that



   positive JANEY results <3.0 U are rarely accompanied by



   positive second order tests.



   Test Performed by:



   [facility] [place] [address]



   : Gus Roach II, M.D., Ph.D.

 

 58  please check this week

 

 59  Normal Range 180 to 914



   Indeterminate Range 145 to 180



   Deficient Range  <145

 

 60  RESULT: 05:01,05



   -------------------REFERENCE VALUE--------------------------



   Not Applicable

 

 61  RESULT: 02:01,03:01



   DQ Serologic Equivalent:



   2,7



   -------------------REFERENCE VALUE--------------------------



   Not Applicable

 

 62  These genes are permissive for celiac disease.  The absence



   of HLA celiac permissive genes would make the presence of



   celiac disease unlikely.  However, these genes can also be



   present in the normal population.



   -------------------ADDITIONAL INFORMATION-------------------



   Method: Molecular typing of HLA antigens performed using



   reverse SSOP and/or SSP methods, reported as serological



   equivalents and low to medium resolution molecular values.



   Performing Laboratory CLIA# [id number]



   Test Performed by:



   [place] [address]



   : Gus Roach II, M.D., Ph.D.

 

 63  *******Because ethnic data is not always readily available,



   this report includes an eGFR for both -Americans and



   non- Americans.****



   The National Kidney Disease Education Program (NKDEP) does



   not endorse the use of the MDRD equation for patients that



   are not between the ages of 18 and 72, are pregnant, have



   extremes of body size, muscle mass, or nutritional status,



   or are non- or non-.



   According to the [employer], irrespective of



   diagnosis, the stage of the disease is based on the level of



   kidney function:



   Stage Description                      GFR(mL/min/1.73 m(2))



   1     Kidney damage with normal or decreased GFR       90



   2     Kidney damage with mild decrease in GFR          60-89



   3     Moderate decrease in GFR                         30-59



   4     Severe decrease in GFR                           15-29



   5     Kidney failure                       <15 (or dialysis)

 

 64  please check this week

 

 65  Test Performed by:



   [place] [address]



   : Gus Roach II, M.D., Ph.D.

 

 66  Test Performed by:



   [place] [address]



   : Gus Roach II, M.D., Ph.D.

 

 67  please check this week

 

 68  please check this week







Procedures







 Date  Code  Description  Status

 

 2018  38653  Trigger PT Inj(S) Single Or Multiple Points 1 Or 2 Muscles  
Completed

 

 2016  78160  Polysomnography Sleep Staging 4+ Parameters  Completed

 

 2016  34887  Polysomnography Sleep Staging 4+ Parameters  Completed

 

 2016  85587  ECHO Transthoracic, Real-Time 2D With Doppler And Color  
Completed



     Flow  

 

 2011  44772  Visual funct screen test, automated  Completed

 

 2011  31544  Pure Tone-Air Condition Only  Completed







Encounters







 Type  Date  Location  Provider  Dx  Diagnosis

 

 Office Visit  2018  Neurosurgery  [facility]  M50.122  Cervical disc



   11:00a  Services Of Emmanuel Salvador MD    disorder at C5-C6



           level with



           radiculopathy









 M50.123  Cervical disc disorder at C6-C7 level with radiculopathy









 Office Visit  2018  Rheumatology  [facility]



   3:40p  [facility] Emmanuel Crowe M.D.    psoriasis,



           unspecified









 M79.7  Fibromyalgia

 

 R53.83  Other fatigue

 

 M54.6  Pain in thoracic spine









 Office Visit  08/15/2018  Rheumatology  [facility]



   3:00p  [facility] Crowe M.D.    psoriasis,



           unspecified









 M79.7  Fibromyalgia

 

 E87.5  Hyperkalemia

 

 R53.83  Other fatigue

 

 Z79.899  Other long term (current) drug therapy

 

 Z23  Encounter for immunization









 Office Visit  2018  Rheumatology  [facility]  [facility]



   11:20a  [facility] Emmanuel Crowe M.D.    psoriasis,



           unspecified









 M79.7  Fibromyalgia

 

 R53.83  Other fatigue

 

 Z79.899  Other long term (current) drug therapy

 

 R51  Headache

 

 F17.210  Nicotine dependence, cigarettes, uncomplicated









 Office Visit  2018  Rheumatology  [facility]  [facility]



   9:00a  [facility] Cro[facility] M.D.    psoriasis,



           unspecified









 M79.7  Fibromyalgia

 

 R53.83  Other fatigue

 

 Z79.899  Other long term (current) drug therapy

 

 R51  Headache









 Office Visit  2017  Rheumatology  Mykel MCCLURE  [facility]



   1:00p  [facility] Emmanuel Crowe M.D.    psoriasis,



           unspecified









 M79.7  Fibromyalgia

 

 R53.83  Other fatigue

 

 M25.569  Pain in unspecified knee









 Office Visit  10/23/2017  Rheumatology  Mykel MCCLURE  [facility]



   3:00p  [facility] Crowe M.D.    psoriasis,



           unspecified









 M79.7  Fibromyalgia

 

 M15.9  Polyosteoarthritis, unspecified

 

 Z79.899  Other long term (current) drug therapy

 

 Z23  Encounter for immunization









 Office Visit  2017  Rheumatology  Mykel MCCLURE  [facility]



   9:00a  [facility] Cro[facility] M.D.    psoriasis,



           unspecified









 M79.7  Fibromyalgia

 

 M15.9  Polyosteoarthritis, unspecified

 

 Z79.899  Other long term (current) drug therapy









 Office Visit  2017  Rheumatology  [facility]  [facility]



   11:40a  [facility] Cro[facility] M.D.    psoriasis,



           unspecified









 M79.1  Myalgia

 

 M54.5  Low back pain

 

 Z79.899  Other long term (current) drug therapy

 

 G47.9  Sleep disorder, unspecified

 

 M35.01  Sicca syndrome with keratoconjunctivitis

 

 F51.11  Primary hypersomnia

 

 R73.9  Hyperglycemia, unspecified









 Office Visit  2017  Rheumatology  Mykel  L40.50  Arthropathic



   2:40p  [facility] Emmanuel Crowe M.D.    psoriasis,



           unspecified









 M79.1  Myalgia

 

 M54.5  Low back pain

 

 Z79.899  Other long term (current) drug therapy









 Office Visit  10/18/2016  1:15p  Pulmonology And  Pat  G47.9  Sleep 
disorder,



     [facility] Of  RENE Buckley, RN,    unspecified



     [facility]    









 G47.00  Insomnia, unspecified

 

 G47.14  Hypersomnia due to medical condition









 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



   2:00p  [facility] Emmanuel Crowe M.D.    polyarthropathy









 Z79.899  Other long term (current) drug therapy

 

 M54.5  Low back pain

 

 R20.8  Other disturbances of skin sensation

 

 M79.7  Fibromyalgia









 Office Visit  2016  1:40p  Rheumatology Services  ELISEO Wasserman79.1  
Myalgia



     Of Cma  M.DWinter    









 M06.4  Inflammatory polyarthropathy

 

 Z79.899  Other long term (current) drug therapy

 

 M54.5  Low back pain









 Office Visit  2016  1:20p  Rheumatology Services  Mykel Crowe  M79.1  
Myalgia



     Of Cma  M.D.    









 M06.4  Inflammatory polyarthropathy

 

 M54.5  Low back pain

 

 Z79.899  Other long term (current) drug therapy

 

 R74.8  Abnormal levels of other serum enzymes

 

 R53.1  Weakness









 Office Visit  2016  2:00p  Rheumatology Services  ELISEO Wasserman79.1  
Myalgia



     Of Cma  M.DWinter    









 M06.4  Inflammatory polyarthropathy

 

 Z79.899  Other long term (current) drug therapy

 

 M54.5  Low back pain

 

 M54.6  Pain in thoracic spine

 

 R20.8  Other disturbances of skin sensation

 

 L30.9  Dermatitis, unspecified

 

 F17.210  Nicotine dependence, cigarettes, uncomplicated









 Office Visit  2016 11:30a  Pulmonology And  Nicole  G47.9  Sleep 
disorder,



     Sleep [facility] Of  MD Zaira    unspecified



     [facility]      







Plan of Treatment

Future Appointment(s):2019  1:20 pm - Mykel Crowe M.D. at Rheumatology 
Services Of [facility]02/15/2019  9:30 am - Enrique Salvador MD at Neurosurgery 
Services Of [facility]2019 11:00 am - Enrique Salvador MD at Neurosurgery 
Services Of [facility]2019 10:30 am - Myrtle Uribe PA-C at Neurosurgery Services 
Of [facility]2019 10:30 am - Enrique Salvador MD at Neurosurgery Services 
Of [facility]2019 11:15 am - Ananda Mera M.D. 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 
psoriasis, [id number] FibromyalgiaComments:Hold Sulfasalazine 2 days 
before surgery and please do not take Enbrel.  Once surgery is finished and 
your wound has completely healed you may start Enbrel again; this generally 
occurs 2 to 4 weeks after your upcoming surgeryFollow up:Follow up  in 2 or 3 
months or sooner if [id number] Other long term (current) drug szieoprJ00.123 
Cervical disc disorder at C6-C7 level with radiculopathy
dietitian/nutrition services